# Patient Record
Sex: MALE | Race: WHITE | NOT HISPANIC OR LATINO | Employment: FULL TIME | URBAN - METROPOLITAN AREA
[De-identification: names, ages, dates, MRNs, and addresses within clinical notes are randomized per-mention and may not be internally consistent; named-entity substitution may affect disease eponyms.]

---

## 2020-09-01 ENCOUNTER — OFFICE VISIT CONVERTED (OUTPATIENT)
Dept: SURGERY | Facility: CLINIC | Age: 65
End: 2020-09-01
Attending: SURGERY

## 2020-09-03 ENCOUNTER — CONVERSION ENCOUNTER (OUTPATIENT)
Dept: SURGERY | Facility: CLINIC | Age: 65
End: 2020-09-03

## 2020-09-03 ENCOUNTER — OFFICE VISIT CONVERTED (OUTPATIENT)
Dept: SURGERY | Facility: CLINIC | Age: 65
End: 2020-09-03
Attending: SURGERY

## 2020-09-15 ENCOUNTER — OFFICE VISIT CONVERTED (OUTPATIENT)
Dept: SURGERY | Facility: CLINIC | Age: 65
End: 2020-09-15
Attending: SURGERY

## 2020-09-17 ENCOUNTER — HOSPITAL ENCOUNTER (OUTPATIENT)
Dept: NUCLEAR MEDICINE | Facility: HOSPITAL | Age: 65
Discharge: HOME | End: 2020-09-17
Attending: FAMILY MEDICINE

## 2020-09-22 ENCOUNTER — OFFICE VISIT CONVERTED (OUTPATIENT)
Dept: ONCOLOGY | Facility: HOSPITAL | Age: 65
End: 2020-09-22
Attending: INTERNAL MEDICINE

## 2020-09-22 ENCOUNTER — HOSPITAL ENCOUNTER (OUTPATIENT)
Dept: ONCOLOGY | Facility: HOSPITAL | Age: 65
Discharge: HOME | End: 2020-09-22
Attending: INTERNAL MEDICINE

## 2020-10-26 ENCOUNTER — OFFICE VISIT CONVERTED (OUTPATIENT)
Dept: ONCOLOGY | Facility: HOSPITAL | Age: 65
End: 2020-10-26
Attending: INTERNAL MEDICINE

## 2020-11-24 ENCOUNTER — CONVERSION ENCOUNTER (OUTPATIENT)
Dept: SURGERY | Facility: CLINIC | Age: 65
End: 2020-11-24

## 2020-11-24 ENCOUNTER — OFFICE VISIT CONVERTED (OUTPATIENT)
Dept: SURGERY | Facility: CLINIC | Age: 65
End: 2020-11-24
Attending: SURGERY

## 2020-11-25 ENCOUNTER — HOSPITAL ENCOUNTER (OUTPATIENT)
Dept: GASTROENTEROLOGY | Facility: HOSPITAL | Age: 65
Setting detail: HOSPITAL OUTPATIENT SURGERY
Discharge: HOME | End: 2020-11-25
Attending: SURGERY

## 2020-12-07 ENCOUNTER — HOSPITAL ENCOUNTER (OUTPATIENT)
Dept: OTHER | Facility: HOSPITAL | Age: 65
Discharge: HOME | End: 2020-12-07
Attending: INTERNAL MEDICINE

## 2020-12-07 ENCOUNTER — OFFICE VISIT CONVERTED (OUTPATIENT)
Dept: ONCOLOGY | Facility: HOSPITAL | Age: 65
End: 2020-12-07
Attending: INTERNAL MEDICINE

## 2020-12-07 LAB
ALBUMIN SERPL-MCNC: 3.7 G/DL (ref 3.5–5)
ALBUMIN/GLOB SERPL: 0.9 {RATIO} (ref 1.4–2.6)
ALP SERPL-CCNC: 162 U/L (ref 56–155)
ALT SERPL-CCNC: 27 U/L (ref 10–40)
ANION GAP SERPL CALC-SCNC: 17 MMOL/L (ref 8–19)
AST SERPL-CCNC: 34 U/L (ref 15–50)
BASOPHILS # BLD AUTO: 0.11 10*3/UL (ref 0–0.2)
BASOPHILS NFR BLD AUTO: 0.8 % (ref 0–3)
BILIRUB SERPL-MCNC: 0.43 MG/DL (ref 0.2–1.3)
BUN SERPL-MCNC: 11 MG/DL (ref 5–25)
BUN/CREAT SERPL: 13 {RATIO} (ref 6–20)
CALCIUM SERPL-MCNC: 9.5 MG/DL (ref 8.7–10.4)
CHLORIDE SERPL-SCNC: 95 MMOL/L (ref 99–111)
CONV ABS IMM GRAN: 0.13 10*3/UL (ref 0–0.2)
CONV CO2: 25 MMOL/L (ref 22–32)
CONV IMMATURE GRAN: 0.9 % (ref 0–1.8)
CONV TOTAL PROTEIN: 7.9 G/DL (ref 6.3–8.2)
CREAT UR-MCNC: 0.82 MG/DL (ref 0.7–1.2)
DEPRECATED RDW RBC AUTO: 38.5 FL (ref 35.1–43.9)
EOSINOPHIL # BLD AUTO: 0.41 10*3/UL (ref 0–0.7)
EOSINOPHIL # BLD AUTO: 2.8 % (ref 0–7)
ERYTHROCYTE [DISTWIDTH] IN BLOOD BY AUTOMATED COUNT: 13.5 % (ref 11.6–14.4)
GFR SERPLBLD BASED ON 1.73 SQ M-ARVRAT: >60 ML/MIN/{1.73_M2}
GLOBULIN UR ELPH-MCNC: 4.2 G/DL (ref 2–3.5)
GLUCOSE SERPL-MCNC: 103 MG/DL (ref 70–99)
HCT VFR BLD AUTO: 36.1 % (ref 42–52)
HGB BLD-MCNC: 11.5 G/DL (ref 14–18)
LYMPHOCYTES # BLD AUTO: 2.29 10*3/UL (ref 1–5)
LYMPHOCYTES NFR BLD AUTO: 15.8 % (ref 20–45)
MCH RBC QN AUTO: 25.2 PG (ref 27–31)
MCHC RBC AUTO-ENTMCNC: 31.9 G/DL (ref 33–37)
MCV RBC AUTO: 79.2 FL (ref 80–96)
MONOCYTES # BLD AUTO: 1.09 10*3/UL (ref 0.2–1.2)
MONOCYTES NFR BLD AUTO: 7.5 % (ref 3–10)
NEUTROPHILS # BLD AUTO: 10.49 10*3/UL (ref 2–8)
NEUTROPHILS NFR BLD AUTO: 72.2 % (ref 30–85)
NRBC CBCN: 0 % (ref 0–0.7)
OSMOLALITY SERPL CALC.SUM OF ELEC: 276 MOSM/KG (ref 273–304)
PLATELET # BLD AUTO: 507 10*3/UL (ref 130–400)
PMV BLD AUTO: 8.5 FL (ref 9.4–12.4)
POTASSIUM SERPL-SCNC: 4.3 MMOL/L (ref 3.5–5.3)
RBC # BLD AUTO: 4.56 10*6/UL (ref 4.7–6.1)
SODIUM SERPL-SCNC: 133 MMOL/L (ref 135–147)
WBC # BLD AUTO: 14.52 10*3/UL (ref 4.8–10.8)

## 2020-12-10 ENCOUNTER — HOSPITAL ENCOUNTER (OUTPATIENT)
Dept: OTHER | Facility: HOSPITAL | Age: 65
Setting detail: RECURRING SERIES
Discharge: HOME | End: 2021-03-10
Attending: INTERNAL MEDICINE

## 2020-12-17 LAB
ALBUMIN SERPL-MCNC: 3.2 G/DL (ref 3.5–5)
ALBUMIN/GLOB SERPL: 0.9 {RATIO} (ref 1.4–2.6)
ALP SERPL-CCNC: 134 U/L (ref 56–155)
ALT SERPL-CCNC: 49 U/L (ref 10–40)
ANION GAP SERPL CALC-SCNC: 14 MMOL/L (ref 8–19)
AST SERPL-CCNC: 31 U/L (ref 15–50)
BASOPHILS # BLD AUTO: 0.03 10*3/UL (ref 0–0.2)
BASOPHILS NFR BLD AUTO: 0.2 % (ref 0–3)
BILIRUB SERPL-MCNC: 0.23 MG/DL (ref 0.2–1.3)
BUN SERPL-MCNC: 11 MG/DL (ref 5–25)
BUN/CREAT SERPL: 16 {RATIO} (ref 6–20)
CALCIUM SERPL-MCNC: 9.2 MG/DL (ref 8.7–10.4)
CHLORIDE SERPL-SCNC: 100 MMOL/L (ref 99–111)
CONV ABS IMM GRAN: 0.38 10*3/UL (ref 0–0.54)
CONV CO2: 22 MMOL/L (ref 22–32)
CONV EOSINOPHILS PERCENT BY MANUAL COUNT: 3.9 % (ref 0–7)
CONV IMMATURE GRAN: 3.1 % (ref 0–0.4)
CONV TOTAL PROTEIN: 6.6 G/DL (ref 6.3–8.2)
CREAT UR-MCNC: 0.69 MG/DL (ref 0.7–1.2)
EOSINOPHIL # BLD MANUAL: 0.47 10*3/UL (ref 0–0.7)
ERYTHROCYTE [DISTWIDTH] IN BLOOD BY AUTOMATED COUNT: 13.4 % (ref 11.5–14.5)
ERYTHROCYTE [DISTWIDTH] IN BLOOD BY AUTOMATED COUNT: 39.8 FL
GFR SERPLBLD BASED ON 1.73 SQ M-ARVRAT: >60 ML/MIN/{1.73_M2}
GLOBULIN UR ELPH-MCNC: 3.4 G/DL (ref 2–3.5)
GLUCOSE SERPL-MCNC: 255 MG/DL (ref 70–99)
HBA1C MFR BLD: 10.8 G/DL (ref 14–18)
HCT VFR BLD AUTO: 34.1 % (ref 42–52)
LYMPHOCYTES # BLD AUTO: 1.77 10*3/UL (ref 1–5)
LYMPHOCYTES NFR BLD AUTO: 14.6 % (ref 20–45)
MAGNESIUM SERPL-MCNC: 1.81 MG/DL (ref 1.6–2.3)
MCH RBC QN AUTO: 25.7 PG (ref 27–31)
MCHC RBC AUTO-ENTMCNC: 31.7 G/DL (ref 33–37)
MCV RBC AUTO: 81.2 FL (ref 80–96)
MONOCYTES # BLD AUTO: 0.65 10*3/UL (ref 0.2–1.2)
MONOCYTES NFR BLD MANUAL: 5.4 % (ref 3–10)
NEUTROPHILS # BLD AUTO: 8.83 10*3/UL (ref 2–8)
NEUTROPHILS NFR BLD MANUAL: 72.8 % (ref 30–85)
OSMOLALITY SERPL CALC.SUM OF ELEC: 282 MOSM/KG (ref 273–304)
PLATELET # BLD AUTO: 146 10*3/UL (ref 130–400)
PMV BLD AUTO: 8.8 FL (ref 7.4–10.4)
POTASSIUM SERPL-SCNC: 3.9 MMOL/L (ref 3.5–5.3)
RBC MORPH BLD: 4.2 10*6/UL (ref 4.7–6.1)
SODIUM SERPL-SCNC: 132 MMOL/L (ref 135–147)
WBC # BLD AUTO: 12.13 10*3/UL (ref 4.8–10.8)

## 2020-12-31 ENCOUNTER — OFFICE VISIT CONVERTED (OUTPATIENT)
Dept: ONCOLOGY | Facility: HOSPITAL | Age: 65
End: 2020-12-31
Attending: INTERNAL MEDICINE

## 2020-12-31 LAB
ALBUMIN SERPL-MCNC: 3.4 G/DL (ref 3.5–5)
ALBUMIN/GLOB SERPL: 1 {RATIO} (ref 1.4–2.6)
ALP SERPL-CCNC: 102 U/L (ref 56–155)
ALT SERPL-CCNC: 16 U/L (ref 10–40)
ANION GAP SERPL CALC-SCNC: 14 MMOL/L (ref 8–19)
AST SERPL-CCNC: 12 U/L (ref 15–50)
BASOPHILS # BLD AUTO: 0.06 10*3/UL (ref 0–0.2)
BASOPHILS NFR BLD AUTO: 0.4 % (ref 0–3)
BILIRUB SERPL-MCNC: 0.32 MG/DL (ref 0.2–1.3)
BUN SERPL-MCNC: 13 MG/DL (ref 5–25)
BUN/CREAT SERPL: 19 {RATIO} (ref 6–20)
CALCIUM SERPL-MCNC: 9.5 MG/DL (ref 8.7–10.4)
CHLORIDE SERPL-SCNC: 98 MMOL/L (ref 99–111)
CONV ABS IMM GRAN: 0.5 10*3/UL (ref 0–0.54)
CONV CO2: 25 MMOL/L (ref 22–32)
CONV EOSINOPHILS PERCENT BY MANUAL COUNT: 12.3 % (ref 0–7)
CONV IMMATURE GRAN: 3.5 % (ref 0–0.4)
CONV TOTAL PROTEIN: 6.7 G/DL (ref 6.3–8.2)
CREAT UR-MCNC: 0.69 MG/DL (ref 0.7–1.2)
EOSINOPHIL # BLD MANUAL: 1.78 10*3/UL (ref 0–0.7)
ERYTHROCYTE [DISTWIDTH] IN BLOOD BY AUTOMATED COUNT: 15.3 % (ref 11.5–14.5)
ERYTHROCYTE [DISTWIDTH] IN BLOOD BY AUTOMATED COUNT: 43.3 FL
FERRITIN SERPL-MCNC: 899 NG/ML (ref 30–300)
GFR SERPLBLD BASED ON 1.73 SQ M-ARVRAT: >60 ML/MIN/{1.73_M2}
GLOBULIN UR ELPH-MCNC: 3.3 G/DL (ref 2–3.5)
GLUCOSE SERPL-MCNC: 230 MG/DL (ref 70–99)
HBA1C MFR BLD: 10.2 G/DL (ref 14–18)
HCT VFR BLD AUTO: 32.2 % (ref 42–52)
IRON SATN MFR SERPL: 12 % (ref 20–55)
IRON SERPL-MCNC: 27 UG/DL (ref 70–180)
LYMPHOCYTES # BLD AUTO: 1.72 10*3/UL (ref 1–5)
LYMPHOCYTES NFR BLD AUTO: 11.9 % (ref 20–45)
MAGNESIUM SERPL-MCNC: 1.83 MG/DL (ref 1.6–2.3)
MCH RBC QN AUTO: 25.9 PG (ref 27–31)
MCHC RBC AUTO-ENTMCNC: 31.7 G/DL (ref 33–37)
MCV RBC AUTO: 81.7 FL (ref 80–96)
MONOCYTES # BLD AUTO: 1.24 10*3/UL (ref 0.2–1.2)
MONOCYTES NFR BLD MANUAL: 8.6 % (ref 3–10)
NEUTROPHILS # BLD AUTO: 9.12 10*3/UL (ref 2–8)
NEUTROPHILS NFR BLD MANUAL: 63.3 % (ref 30–85)
OSMOLALITY SERPL CALC.SUM OF ELEC: 283 MOSM/KG (ref 273–304)
PLATELET # BLD AUTO: 689 10*3/UL (ref 130–400)
PMV BLD AUTO: 8.4 FL (ref 7.4–10.4)
POTASSIUM SERPL-SCNC: 4.3 MMOL/L (ref 3.5–5.3)
RBC MORPH BLD: 3.94 10*6/UL (ref 4.7–6.1)
SODIUM SERPL-SCNC: 133 MMOL/L (ref 135–147)
TIBC SERPL-MCNC: 225 UG/DL (ref 245–450)
TRANSFERRIN SERPL-MCNC: 157 MG/DL (ref 215–365)
WBC # BLD AUTO: 14.42 10*3/UL (ref 4.8–10.8)

## 2021-01-07 LAB
ALBUMIN SERPL-MCNC: 3.5 G/DL (ref 3.5–5)
ALBUMIN/GLOB SERPL: 1.1 {RATIO} (ref 1.4–2.6)
ALP SERPL-CCNC: 114 U/L (ref 56–155)
ALT SERPL-CCNC: 25 U/L (ref 10–40)
ANION GAP SERPL CALC-SCNC: 15 MMOL/L (ref 8–19)
AST SERPL-CCNC: 13 U/L (ref 15–50)
BASOPHILS # BLD AUTO: 0.05 10*3/UL (ref 0–0.2)
BASOPHILS NFR BLD AUTO: 0.3 % (ref 0–3)
BILIRUB SERPL-MCNC: 0.29 MG/DL (ref 0.2–1.3)
BUN SERPL-MCNC: 18 MG/DL (ref 5–25)
BUN/CREAT SERPL: 22 {RATIO} (ref 6–20)
CALCIUM SERPL-MCNC: 9.6 MG/DL (ref 8.7–10.4)
CHLORIDE SERPL-SCNC: 95 MMOL/L (ref 99–111)
CONV ABS IMM GRAN: 1.13 10*3/UL (ref 0–0.54)
CONV CO2: 24 MMOL/L (ref 22–32)
CONV EOSINOPHILS PERCENT BY MANUAL COUNT: 5.5 % (ref 0–7)
CONV IMMATURE GRAN: 6.7 % (ref 0–0.4)
CONV TOTAL PROTEIN: 6.8 G/DL (ref 6.3–8.2)
CREAT UR-MCNC: 0.81 MG/DL (ref 0.7–1.2)
EOSINOPHIL # BLD MANUAL: 0.93 10*3/UL (ref 0–0.7)
ERYTHROCYTE [DISTWIDTH] IN BLOOD BY AUTOMATED COUNT: 14.9 % (ref 11.5–14.5)
ERYTHROCYTE [DISTWIDTH] IN BLOOD BY AUTOMATED COUNT: 42.2 FL
GFR SERPLBLD BASED ON 1.73 SQ M-ARVRAT: >60 ML/MIN/{1.73_M2}
GLOBULIN UR ELPH-MCNC: 3.3 G/DL (ref 2–3.5)
GLUCOSE SERPL-MCNC: 311 MG/DL (ref 70–99)
HBA1C MFR BLD: 10.4 G/DL (ref 14–18)
HCT VFR BLD AUTO: 32.8 % (ref 42–52)
LYMPHOCYTES # BLD AUTO: 1.85 10*3/UL (ref 1–5)
LYMPHOCYTES NFR BLD AUTO: 10.9 % (ref 20–45)
MAGNESIUM SERPL-MCNC: 1.85 MG/DL (ref 1.6–2.3)
MCH RBC QN AUTO: 25.6 PG (ref 27–31)
MCHC RBC AUTO-ENTMCNC: 31.7 G/DL (ref 33–37)
MCV RBC AUTO: 80.8 FL (ref 80–96)
MONOCYTES # BLD AUTO: 1.08 10*3/UL (ref 0.2–1.2)
MONOCYTES NFR BLD MANUAL: 6.4 % (ref 3–10)
NEUTROPHILS # BLD AUTO: 11.92 10*3/UL (ref 2–8)
NEUTROPHILS NFR BLD MANUAL: 70.2 % (ref 30–85)
OSMOLALITY SERPL CALC.SUM OF ELEC: 284 MOSM/KG (ref 273–304)
PLATELET # BLD AUTO: 154 10*3/UL (ref 130–400)
PMV BLD AUTO: 8.8 FL (ref 7.4–10.4)
POTASSIUM SERPL-SCNC: 4.1 MMOL/L (ref 3.5–5.3)
RBC MORPH BLD: 4.06 10*6/UL (ref 4.7–6.1)
SODIUM SERPL-SCNC: 130 MMOL/L (ref 135–147)
WBC # BLD AUTO: 16.96 10*3/UL (ref 4.8–10.8)

## 2021-01-21 ENCOUNTER — OFFICE VISIT CONVERTED (OUTPATIENT)
Dept: ONCOLOGY | Facility: HOSPITAL | Age: 66
End: 2021-01-21
Attending: INTERNAL MEDICINE

## 2021-01-21 LAB
ALBUMIN SERPL-MCNC: 3.3 G/DL (ref 3.5–5)
ALBUMIN/GLOB SERPL: 1.1 {RATIO} (ref 1.4–2.6)
ALP SERPL-CCNC: 91 U/L (ref 56–155)
ALT SERPL-CCNC: 10 U/L (ref 10–40)
ANION GAP SERPL CALC-SCNC: 16 MMOL/L (ref 8–19)
AST SERPL-CCNC: 9 U/L (ref 15–50)
BASOPHILS # BLD AUTO: 0.07 10*3/UL (ref 0–0.2)
BASOPHILS NFR BLD AUTO: 0.5 % (ref 0–3)
BILIRUB SERPL-MCNC: 0.44 MG/DL (ref 0.2–1.3)
BUN SERPL-MCNC: 14 MG/DL (ref 5–25)
BUN/CREAT SERPL: 20 {RATIO} (ref 6–20)
CALCIUM SERPL-MCNC: 9.4 MG/DL (ref 8.7–10.4)
CHLORIDE SERPL-SCNC: 99 MMOL/L (ref 99–111)
CONV ABS IMM GRAN: 0.32 10*3/UL (ref 0–0.54)
CONV CO2: 23 MMOL/L (ref 22–32)
CONV EOSINOPHILS PERCENT BY MANUAL COUNT: 17.8 % (ref 0–7)
CONV IMMATURE GRAN: 2.1 % (ref 0–0.4)
CONV TOTAL PROTEIN: 6.4 G/DL (ref 6.3–8.2)
CREAT UR-MCNC: 0.7 MG/DL (ref 0.7–1.2)
EOSINOPHIL # BLD MANUAL: 2.71 10*3/UL (ref 0–0.7)
ERYTHROCYTE [DISTWIDTH] IN BLOOD BY AUTOMATED COUNT: 17.5 % (ref 11.5–14.5)
ERYTHROCYTE [DISTWIDTH] IN BLOOD BY AUTOMATED COUNT: 51.2 FL
GFR SERPLBLD BASED ON 1.73 SQ M-ARVRAT: >60 ML/MIN/{1.73_M2}
GLOBULIN UR ELPH-MCNC: 3.1 G/DL (ref 2–3.5)
GLUCOSE SERPL-MCNC: 197 MG/DL (ref 70–99)
HBA1C MFR BLD: 8.9 G/DL (ref 14–18)
HCT VFR BLD AUTO: 27.9 % (ref 42–52)
LYMPHOCYTES # BLD AUTO: 1.51 10*3/UL (ref 1–5)
LYMPHOCYTES NFR BLD AUTO: 9.9 % (ref 20–45)
MAGNESIUM SERPL-MCNC: 1.78 MG/DL (ref 1.6–2.3)
MCH RBC QN AUTO: 26.4 PG (ref 27–31)
MCHC RBC AUTO-ENTMCNC: 31.9 G/DL (ref 33–37)
MCV RBC AUTO: 82.8 FL (ref 80–96)
MONOCYTES # BLD AUTO: 1.29 10*3/UL (ref 0.2–1.2)
MONOCYTES NFR BLD MANUAL: 8.5 % (ref 3–10)
NEUTROPHILS # BLD AUTO: 9.35 10*3/UL (ref 2–8)
NEUTROPHILS NFR BLD MANUAL: 61.2 % (ref 30–85)
OSMOLALITY SERPL CALC.SUM OF ELEC: 284 MOSM/KG (ref 273–304)
PLATELET # BLD AUTO: 484 10*3/UL (ref 130–400)
PMV BLD AUTO: 8.7 FL (ref 7.4–10.4)
POTASSIUM SERPL-SCNC: 4 MMOL/L (ref 3.5–5.3)
RBC MORPH BLD: 3.37 10*6/UL (ref 4.7–6.1)
SODIUM SERPL-SCNC: 134 MMOL/L (ref 135–147)
WBC # BLD AUTO: 15.25 10*3/UL (ref 4.8–10.8)

## 2021-01-28 LAB
ALBUMIN SERPL-MCNC: 3.5 G/DL (ref 3.5–5)
ALBUMIN/GLOB SERPL: 1 {RATIO} (ref 1.4–2.6)
ALP SERPL-CCNC: 134 U/L (ref 56–155)
ALT SERPL-CCNC: 28 U/L (ref 10–40)
ANION GAP SERPL CALC-SCNC: 18 MMOL/L (ref 8–19)
AST SERPL-CCNC: 13 U/L (ref 15–50)
BASOPHILS # BLD AUTO: 0.04 10*3/UL (ref 0–0.2)
BASOPHILS NFR BLD AUTO: 0.2 % (ref 0–3)
BILIRUB SERPL-MCNC: 0.39 MG/DL (ref 0.2–1.3)
BUN SERPL-MCNC: 19 MG/DL (ref 5–25)
BUN/CREAT SERPL: 25 {RATIO} (ref 6–20)
CALCIUM SERPL-MCNC: 9.4 MG/DL (ref 8.7–10.4)
CHLORIDE SERPL-SCNC: 95 MMOL/L (ref 99–111)
CONV ABS IMM GRAN: 0.72 10*3/UL (ref 0–0.54)
CONV CO2: 22 MMOL/L (ref 22–32)
CONV EOSINOPHILS PERCENT BY MANUAL COUNT: 6.9 % (ref 0–7)
CONV IMMATURE GRAN: 4.4 % (ref 0–0.4)
CONV TOTAL PROTEIN: 7 G/DL (ref 6.3–8.2)
CREAT UR-MCNC: 0.76 MG/DL (ref 0.7–1.2)
EOSINOPHIL # BLD MANUAL: 1.13 10*3/UL (ref 0–0.7)
ERYTHROCYTE [DISTWIDTH] IN BLOOD BY AUTOMATED COUNT: 17.4 % (ref 11.5–14.5)
ERYTHROCYTE [DISTWIDTH] IN BLOOD BY AUTOMATED COUNT: 51.9 FL
GFR SERPLBLD BASED ON 1.73 SQ M-ARVRAT: >60 ML/MIN/{1.73_M2}
GLOBULIN UR ELPH-MCNC: 3.5 G/DL (ref 2–3.5)
GLUCOSE SERPL-MCNC: 242 MG/DL (ref 70–99)
HBA1C MFR BLD: 9.3 G/DL (ref 14–18)
HCT VFR BLD AUTO: 29.5 % (ref 42–52)
LYMPHOCYTES # BLD AUTO: 2.01 10*3/UL (ref 1–5)
LYMPHOCYTES NFR BLD AUTO: 12.3 % (ref 20–45)
MAGNESIUM SERPL-MCNC: 1.93 MG/DL (ref 1.6–2.3)
MCH RBC QN AUTO: 26.3 PG (ref 27–31)
MCHC RBC AUTO-ENTMCNC: 31.5 G/DL (ref 33–37)
MCV RBC AUTO: 83.3 FL (ref 80–96)
MONOCYTES # BLD AUTO: 1.39 10*3/UL (ref 0.2–1.2)
MONOCYTES NFR BLD MANUAL: 8.5 % (ref 3–10)
NEUTROPHILS # BLD AUTO: 11.11 10*3/UL (ref 2–8)
NEUTROPHILS NFR BLD MANUAL: 67.7 % (ref 30–85)
OSMOLALITY SERPL CALC.SUM OF ELEC: 282 MOSM/KG (ref 273–304)
PLATELET # BLD AUTO: 131 10*3/UL (ref 130–400)
PMV BLD AUTO: 8.4 FL (ref 7.4–10.4)
POTASSIUM SERPL-SCNC: 4.4 MMOL/L (ref 3.5–5.3)
RBC MORPH BLD: 3.54 10*6/UL (ref 4.7–6.1)
SODIUM SERPL-SCNC: 131 MMOL/L (ref 135–147)
WBC # BLD AUTO: 16.4 10*3/UL (ref 4.8–10.8)

## 2021-02-04 ENCOUNTER — HOSPITAL ENCOUNTER (OUTPATIENT)
Dept: CT IMAGING | Facility: HOSPITAL | Age: 66
Discharge: HOME | End: 2021-02-04
Attending: INTERNAL MEDICINE

## 2021-02-12 LAB
ALBUMIN SERPL-MCNC: 3.2 G/DL (ref 3.5–5)
ALBUMIN/GLOB SERPL: 1 {RATIO} (ref 1.4–2.6)
ALP SERPL-CCNC: 107 U/L (ref 56–155)
ALT SERPL-CCNC: 10 U/L (ref 10–40)
ANION GAP SERPL CALC-SCNC: 18 MMOL/L (ref 8–19)
AST SERPL-CCNC: 10 U/L (ref 15–50)
BASOPHILS # BLD AUTO: 0.04 10*3/UL (ref 0–0.2)
BASOPHILS NFR BLD AUTO: 0.2 % (ref 0–3)
BILIRUB SERPL-MCNC: 0.47 MG/DL (ref 0.2–1.3)
BUN SERPL-MCNC: 13 MG/DL (ref 5–25)
BUN/CREAT SERPL: 21 {RATIO} (ref 6–20)
CALCIUM SERPL-MCNC: 9.2 MG/DL (ref 8.7–10.4)
CHLORIDE SERPL-SCNC: 94 MMOL/L (ref 99–111)
CONV ABS IMM GRAN: 0.5 10*3/UL (ref 0–0.54)
CONV CO2: 20 MMOL/L (ref 22–32)
CONV EOSINOPHILS PERCENT BY MANUAL COUNT: 9.3 % (ref 0–7)
CONV IMMATURE GRAN: 2.9 % (ref 0–0.4)
CONV TOTAL PROTEIN: 6.4 G/DL (ref 6.3–8.2)
CREAT UR-MCNC: 0.63 MG/DL (ref 0.7–1.2)
EOSINOPHIL # BLD MANUAL: 1.61 10*3/UL (ref 0–0.7)
ERYTHROCYTE [DISTWIDTH] IN BLOOD BY AUTOMATED COUNT: 18.9 % (ref 11.5–14.5)
ERYTHROCYTE [DISTWIDTH] IN BLOOD BY AUTOMATED COUNT: 57.6 FL
GFR SERPLBLD BASED ON 1.73 SQ M-ARVRAT: >60 ML/MIN/{1.73_M2}
GLOBULIN UR ELPH-MCNC: 3.2 G/DL (ref 2–3.5)
GLUCOSE SERPL-MCNC: 177 MG/DL (ref 70–99)
HBA1C MFR BLD: 8.4 G/DL (ref 14–18)
HCT VFR BLD AUTO: 27 % (ref 42–52)
LYMPHOCYTES # BLD AUTO: 2.02 10*3/UL (ref 1–5)
LYMPHOCYTES NFR BLD AUTO: 11.7 % (ref 20–45)
MAGNESIUM SERPL-MCNC: 1.76 MG/DL (ref 1.6–2.3)
MCH RBC QN AUTO: 26.4 PG (ref 27–31)
MCHC RBC AUTO-ENTMCNC: 31.1 G/DL (ref 33–37)
MCV RBC AUTO: 84.9 FL (ref 80–96)
MONOCYTES # BLD AUTO: 1.98 10*3/UL (ref 0.2–1.2)
MONOCYTES NFR BLD MANUAL: 11.4 % (ref 3–10)
NEUTROPHILS # BLD AUTO: 11.15 10*3/UL (ref 2–8)
NEUTROPHILS NFR BLD MANUAL: 64.5 % (ref 30–85)
OSMOLALITY SERPL CALC.SUM OF ELEC: 270 MOSM/KG (ref 273–304)
PLATELET # BLD AUTO: 610 10*3/UL (ref 130–400)
PMV BLD AUTO: 8.6 FL (ref 7.4–10.4)
POTASSIUM SERPL-SCNC: 3.8 MMOL/L (ref 3.5–5.3)
RBC MORPH BLD: 3.18 10*6/UL (ref 4.7–6.1)
SODIUM SERPL-SCNC: 128 MMOL/L (ref 135–147)
WBC # BLD AUTO: 17.3 10*3/UL (ref 4.8–10.8)

## 2021-02-16 ENCOUNTER — OFFICE VISIT CONVERTED (OUTPATIENT)
Dept: ONCOLOGY | Facility: HOSPITAL | Age: 66
End: 2021-02-16
Attending: INTERNAL MEDICINE

## 2021-03-02 ENCOUNTER — OFFICE VISIT CONVERTED (OUTPATIENT)
Dept: ONCOLOGY | Facility: HOSPITAL | Age: 66
End: 2021-03-02
Attending: INTERNAL MEDICINE

## 2021-03-02 LAB
ALBUMIN SERPL-MCNC: 3 G/DL (ref 3.5–5)
ALBUMIN/GLOB SERPL: 0.9 {RATIO} (ref 1.4–2.6)
ALP SERPL-CCNC: 124 U/L (ref 56–155)
ALT SERPL-CCNC: 11 U/L (ref 10–40)
ANION GAP SERPL CALC-SCNC: 16 MMOL/L (ref 8–19)
AST SERPL-CCNC: 15 U/L (ref 15–50)
BASOPHILS # BLD AUTO: 0.01 10*3/UL (ref 0–0.2)
BASOPHILS NFR BLD AUTO: 0 % (ref 0–3)
BILIRUB SERPL-MCNC: 0.34 MG/DL (ref 0.2–1.3)
BUN SERPL-MCNC: 15 MG/DL (ref 5–25)
BUN/CREAT SERPL: 24 {RATIO} (ref 6–20)
CALCIUM SERPL-MCNC: 9.3 MG/DL (ref 8.7–10.4)
CHLORIDE SERPL-SCNC: 91 MMOL/L (ref 99–111)
CONV ABS IMM GRAN: 0.42 10*3/UL (ref 0–0.54)
CONV CO2: 22 MMOL/L (ref 22–32)
CONV EOSINOPHILS PERCENT BY MANUAL COUNT: 1.9 % (ref 0–7)
CONV IMMATURE GRAN: 2 % (ref 0–0.4)
CONV TOTAL PROTEIN: 6.2 G/DL (ref 6.3–8.2)
CREAT UR-MCNC: 0.63 MG/DL (ref 0.7–1.2)
EOSINOPHIL # BLD MANUAL: 0.39 10*3/UL (ref 0–0.7)
ERYTHROCYTE [DISTWIDTH] IN BLOOD BY AUTOMATED COUNT: 17.1 % (ref 11.5–14.5)
ERYTHROCYTE [DISTWIDTH] IN BLOOD BY AUTOMATED COUNT: 52.6 FL
GFR SERPLBLD BASED ON 1.73 SQ M-ARVRAT: >60 ML/MIN/{1.73_M2}
GLOBULIN UR ELPH-MCNC: 3.2 G/DL (ref 2–3.5)
GLUCOSE SERPL-MCNC: 213 MG/DL (ref 70–99)
HBA1C MFR BLD: 9.1 G/DL (ref 14–18)
HCT VFR BLD AUTO: 29.3 % (ref 42–52)
LYMPHOCYTES # BLD AUTO: 0.85 10*3/UL (ref 1–5)
LYMPHOCYTES NFR BLD AUTO: 4.1 % (ref 20–45)
MCH RBC QN AUTO: 26 PG (ref 27–31)
MCHC RBC AUTO-ENTMCNC: 31.1 G/DL (ref 33–37)
MCV RBC AUTO: 83.7 FL (ref 80–96)
MONOCYTES # BLD AUTO: 1.37 10*3/UL (ref 0.2–1.2)
MONOCYTES NFR BLD MANUAL: 6.6 % (ref 3–10)
NEUTROPHILS # BLD AUTO: 17.64 10*3/UL (ref 2–8)
NEUTROPHILS NFR BLD MANUAL: 85.4 % (ref 30–85)
OSMOLALITY SERPL CALC.SUM OF ELEC: 267 MOSM/KG (ref 273–304)
PLATELET # BLD AUTO: 470 10*3/UL (ref 130–400)
PMV BLD AUTO: 8.6 FL (ref 7.4–10.4)
POTASSIUM SERPL-SCNC: 4.2 MMOL/L (ref 3.5–5.3)
RBC MORPH BLD: 3.5 10*6/UL (ref 4.7–6.1)
SODIUM SERPL-SCNC: 125 MMOL/L (ref 135–147)
T4 FREE SERPL-MCNC: 1.3 NG/DL (ref 0.9–1.8)
TSH SERPL-ACNC: 1.05 M[IU]/L (ref 0.27–4.2)
WBC # BLD AUTO: 20.68 10*3/UL (ref 4.8–10.8)

## 2021-03-23 ENCOUNTER — OFFICE VISIT CONVERTED (OUTPATIENT)
Dept: ONCOLOGY | Facility: HOSPITAL | Age: 66
End: 2021-03-23
Attending: INTERNAL MEDICINE

## 2021-03-23 LAB
ALBUMIN SERPL-MCNC: 2.6 G/DL (ref 3.5–5)
ALBUMIN/GLOB SERPL: 0.8 {RATIO} (ref 1.4–2.6)
ALP SERPL-CCNC: 133 U/L (ref 56–155)
ALT SERPL-CCNC: 9 U/L (ref 10–40)
ANION GAP SERPL CALC-SCNC: 13 MMOL/L (ref 8–19)
AST SERPL-CCNC: 28 U/L (ref 15–50)
BASOPHILS # BLD AUTO: 0.03 10*3/UL (ref 0–0.2)
BASOPHILS NFR BLD AUTO: 0.3 % (ref 0–3)
BILIRUB SERPL-MCNC: 0.26 MG/DL (ref 0.2–1.3)
BUN SERPL-MCNC: 16 MG/DL (ref 5–25)
BUN/CREAT SERPL: 30 {RATIO} (ref 6–20)
CALCIUM SERPL-MCNC: 9.2 MG/DL (ref 8.7–10.4)
CHLORIDE SERPL-SCNC: 97 MMOL/L (ref 99–111)
CONV ABS IMM GRAN: 0.02 10*3/UL (ref 0–0.54)
CONV CO2: 25 MMOL/L (ref 22–32)
CONV EOSINOPHILS PERCENT BY MANUAL COUNT: 2.6 % (ref 0–7)
CONV IMMATURE GRAN: 0.2 % (ref 0–0.4)
CONV TOTAL PROTEIN: 5.8 G/DL (ref 6.3–8.2)
CREAT UR-MCNC: 0.53 MG/DL (ref 0.7–1.2)
EOSINOPHIL # BLD MANUAL: 0.22 10*3/UL (ref 0–0.7)
ERYTHROCYTE [DISTWIDTH] IN BLOOD BY AUTOMATED COUNT: 16.8 % (ref 11.5–14.5)
ERYTHROCYTE [DISTWIDTH] IN BLOOD BY AUTOMATED COUNT: 47.9 FL
GFR SERPLBLD BASED ON 1.73 SQ M-ARVRAT: >60 ML/MIN/{1.73_M2}
GLOBULIN UR ELPH-MCNC: 3.2 G/DL (ref 2–3.5)
GLUCOSE SERPL-MCNC: 147 MG/DL (ref 70–99)
HBA1C MFR BLD: 7.9 G/DL (ref 14–18)
HCT VFR BLD AUTO: 26.5 % (ref 42–52)
LYMPHOCYTES # BLD AUTO: 1.14 10*3/UL (ref 1–5)
LYMPHOCYTES NFR BLD AUTO: 13.3 % (ref 20–45)
MCH RBC QN AUTO: 23.8 PG (ref 27–31)
MCHC RBC AUTO-ENTMCNC: 29.8 G/DL (ref 33–37)
MCV RBC AUTO: 79.8 FL (ref 80–96)
MONOCYTES # BLD AUTO: 0.7 10*3/UL (ref 0.2–1.2)
MONOCYTES NFR BLD MANUAL: 8.1 % (ref 3–10)
NEUTROPHILS # BLD AUTO: 6.48 10*3/UL (ref 2–8)
NEUTROPHILS NFR BLD MANUAL: 75.5 % (ref 30–85)
OSMOLALITY SERPL CALC.SUM OF ELEC: 276 MOSM/KG (ref 273–304)
PLATELET # BLD AUTO: 395 10*3/UL (ref 130–400)
PMV BLD AUTO: 8.7 FL (ref 7.4–10.4)
POTASSIUM SERPL-SCNC: 4.1 MMOL/L (ref 3.5–5.3)
RBC MORPH BLD: 3.32 10*6/UL (ref 4.7–6.1)
SODIUM SERPL-SCNC: 131 MMOL/L (ref 135–147)
T4 FREE SERPL-MCNC: 1.2 NG/DL (ref 0.9–1.8)
TSH SERPL-ACNC: 1.09 M[IU]/L (ref 0.27–4.2)
WBC # BLD AUTO: 8.59 10*3/UL (ref 4.8–10.8)

## 2021-04-08 ENCOUNTER — OFFICE VISIT CONVERTED (OUTPATIENT)
Dept: ONCOLOGY | Facility: HOSPITAL | Age: 66
End: 2021-04-08
Attending: INTERNAL MEDICINE

## 2021-04-16 ENCOUNTER — CONVERSION ENCOUNTER (OUTPATIENT)
Dept: ONCOLOGY | Facility: HOSPITAL | Age: 66
End: 2021-04-16

## 2021-04-16 LAB
ALBUMIN SERPL-MCNC: 2.9 G/DL (ref 3.5–5)
ALBUMIN/GLOB SERPL: 0.9 {RATIO} (ref 1.4–2.6)
ALP SERPL-CCNC: 143 U/L (ref 56–155)
ALT SERPL-CCNC: 19 U/L (ref 10–40)
ANION GAP SERPL CALC-SCNC: 13 MMOL/L (ref 8–19)
AST SERPL-CCNC: 21 U/L (ref 15–50)
BASOPHILS # BLD AUTO: 0.03 10*3/UL (ref 0–0.2)
BASOPHILS NFR BLD AUTO: 0.3 % (ref 0–3)
BILIRUB SERPL-MCNC: 0.24 MG/DL (ref 0.2–1.3)
BUN SERPL-MCNC: 15 MG/DL (ref 5–25)
BUN/CREAT SERPL: 29 {RATIO} (ref 6–20)
CALCIUM SERPL-MCNC: 9.8 MG/DL (ref 8.7–10.4)
CHLORIDE SERPL-SCNC: 96 MMOL/L (ref 99–111)
CONV ABS IMM GRAN: 0.1 10*3/UL (ref 0–0.54)
CONV CO2: 24 MMOL/L (ref 22–32)
CONV EOSINOPHILS PERCENT BY MANUAL COUNT: 1.7 % (ref 0–7)
CONV IMMATURE GRAN: 0.9 % (ref 0–0.4)
CONV TOTAL PROTEIN: 6.2 G/DL (ref 6.3–8.2)
CREAT UR-MCNC: 0.51 MG/DL (ref 0.7–1.2)
EOSINOPHIL # BLD MANUAL: 0.2 10*3/UL (ref 0–0.7)
ERYTHROCYTE [DISTWIDTH] IN BLOOD BY AUTOMATED COUNT: 16.7 % (ref 11.5–14.5)
ERYTHROCYTE [DISTWIDTH] IN BLOOD BY AUTOMATED COUNT: 46.1 FL
FERRITIN SERPL-MCNC: 596 NG/ML (ref 30–300)
GFR SERPLBLD BASED ON 1.73 SQ M-ARVRAT: >60 ML/MIN/{1.73_M2}
GLOBULIN UR ELPH-MCNC: 3.3 G/DL (ref 2–3.5)
GLUCOSE SERPL-MCNC: 159 MG/DL (ref 70–99)
HBA1C MFR BLD: 8.2 G/DL (ref 14–18)
HCT VFR BLD AUTO: 27.3 % (ref 42–52)
IRON SATN MFR SERPL: 13 % (ref 20–55)
IRON SERPL-MCNC: 25 UG/DL (ref 70–180)
LYMPHOCYTES # BLD AUTO: 1.71 10*3/UL (ref 1–5)
LYMPHOCYTES NFR BLD AUTO: 14.8 % (ref 20–45)
MCH RBC QN AUTO: 22.7 PG (ref 27–31)
MCHC RBC AUTO-ENTMCNC: 30 G/DL (ref 33–37)
MCV RBC AUTO: 75.4 FL (ref 80–96)
MONOCYTES # BLD AUTO: 1.06 10*3/UL (ref 0.2–1.2)
MONOCYTES NFR BLD MANUAL: 9.2 % (ref 3–10)
NEUTROPHILS # BLD AUTO: 8.42 10*3/UL (ref 2–8)
NEUTROPHILS NFR BLD MANUAL: 73.1 % (ref 30–85)
OSMOLALITY SERPL CALC.SUM OF ELEC: 272 MOSM/KG (ref 273–304)
PLATELET # BLD AUTO: 455 10*3/UL (ref 130–400)
PMV BLD AUTO: 8.1 FL (ref 7.4–10.4)
POTASSIUM SERPL-SCNC: 4.1 MMOL/L (ref 3.5–5.3)
RBC MORPH BLD: 3.62 10*6/UL (ref 4.7–6.1)
SODIUM SERPL-SCNC: 129 MMOL/L (ref 135–147)
TIBC SERPL-MCNC: 190 UG/DL (ref 245–450)
TRANSFERRIN SERPL-MCNC: 133 MG/DL (ref 215–365)
WBC # BLD AUTO: 11.52 10*3/UL (ref 4.8–10.8)

## 2021-05-06 ENCOUNTER — OFFICE VISIT CONVERTED (OUTPATIENT)
Dept: ONCOLOGY | Facility: HOSPITAL | Age: 66
End: 2021-05-06
Attending: INTERNAL MEDICINE

## 2021-05-06 LAB
ALBUMIN SERPL-MCNC: 3.3 G/DL (ref 3.5–5)
ALBUMIN/GLOB SERPL: 0.9 {RATIO} (ref 1.4–2.6)
ALP SERPL-CCNC: 129 U/L (ref 56–155)
ALT SERPL-CCNC: 14 U/L (ref 10–40)
ANION GAP SERPL CALC-SCNC: 15 MMOL/L (ref 8–19)
AST SERPL-CCNC: 11 U/L (ref 15–50)
BASOPHILS # BLD AUTO: 0.03 10*3/UL (ref 0–0.2)
BASOPHILS NFR BLD AUTO: 0.2 % (ref 0–3)
BILIRUB SERPL-MCNC: 0.21 MG/DL (ref 0.2–1.3)
BUN SERPL-MCNC: 14 MG/DL (ref 5–25)
BUN/CREAT SERPL: 31 {RATIO} (ref 6–20)
CALCIUM SERPL-MCNC: 9.6 MG/DL (ref 8.7–10.4)
CHLORIDE SERPL-SCNC: 95 MMOL/L (ref 99–111)
CONV ABS IMM GRAN: 0.17 10*3/UL (ref 0–0.54)
CONV CO2: 24 MMOL/L (ref 22–32)
CONV EOSINOPHILS PERCENT BY MANUAL COUNT: 3.6 % (ref 0–7)
CONV IMMATURE GRAN: 1.3 % (ref 0–0.4)
CONV TOTAL PROTEIN: 6.9 G/DL (ref 6.3–8.2)
CREAT UR-MCNC: 0.45 MG/DL (ref 0.7–1.2)
EOSINOPHIL # BLD MANUAL: 0.48 10*3/UL (ref 0–0.7)
ERYTHROCYTE [DISTWIDTH] IN BLOOD BY AUTOMATED COUNT: 18.1 % (ref 11.5–14.5)
ERYTHROCYTE [DISTWIDTH] IN BLOOD BY AUTOMATED COUNT: 50.1 FL
GFR SERPLBLD BASED ON 1.73 SQ M-ARVRAT: >60 ML/MIN/{1.73_M2}
GLOBULIN UR ELPH-MCNC: 3.6 G/DL (ref 2–3.5)
GLUCOSE SERPL-MCNC: 208 MG/DL (ref 70–99)
HBA1C MFR BLD: 8.2 G/DL (ref 14–18)
HCT VFR BLD AUTO: 27.8 % (ref 42–52)
LYMPHOCYTES # BLD AUTO: 1.68 10*3/UL (ref 1–5)
LYMPHOCYTES NFR BLD AUTO: 12.5 % (ref 20–45)
MCH RBC QN AUTO: 22.8 PG (ref 27–31)
MCHC RBC AUTO-ENTMCNC: 29.5 G/DL (ref 33–37)
MCV RBC AUTO: 77.2 FL (ref 80–96)
MONOCYTES # BLD AUTO: 0.87 10*3/UL (ref 0.2–1.2)
MONOCYTES NFR BLD MANUAL: 6.5 % (ref 3–10)
NEUTROPHILS # BLD AUTO: 10.24 10*3/UL (ref 2–8)
NEUTROPHILS NFR BLD MANUAL: 75.9 % (ref 30–85)
OSMOLALITY SERPL CALC.SUM OF ELEC: 277 MOSM/KG (ref 273–304)
PLATELET # BLD AUTO: 487 10*3/UL (ref 130–400)
PMV BLD AUTO: 8.1 FL (ref 7.4–10.4)
POTASSIUM SERPL-SCNC: 3.7 MMOL/L (ref 3.5–5.3)
RBC MORPH BLD: 3.6 10*6/UL (ref 4.7–6.1)
SODIUM SERPL-SCNC: 130 MMOL/L (ref 135–147)
T4 FREE SERPL-MCNC: 1 NG/DL (ref 0.9–1.8)
TSH SERPL-ACNC: 2.2 M[IU]/L (ref 0.27–4.2)
WBC # BLD AUTO: 13.47 10*3/UL (ref 4.8–10.8)

## 2021-05-10 NOTE — H&P
History and Physical      Patient Name: Elver Sanders   Patient ID: [id number]   Sex: Male   YOB: 1955    Primary Care Provider: Daniel Gonzalez MD   Referring Provider: Daniel Gonzalez MD    Visit Date: November 24, 2020    Provider: Rolando Masters MD   Location: [facility]   Location Address: [address] [place] [address]  [id number]   Location Phone: [phone]          Chief Complaint  · Outpatient History & Physical / Surgical Orders  · Colon Consult      History Of Present Illness  Elver Sanders is a 65 year old /White male who presents to the office today as a consult from Danile Gonzalez MD, Maggie Coon MD, and RADHA SHAH.      The patient was referred to have a colonoscopy.  Patient is  known to me as I removed his gallbladder several months ago when he was in the hospital for acute cholecystitis.  The pathology ended up showing gallbladder cancer and the gallbladder did perforate during the procedure.  Patient has since been seen by Dr. Coon with oncology.  He was seen by Dr. Coon locally and by Dr. Shah at the [facility].  Dr. Shah performed a diagnostic laparoscopy and patient indicates there were several intraperitoneal nodules that were biopsied and says Dr. Shah told the patient that he is very concerned the nodules are metastatic cancer.  One of the nodules was apparently located on the surface of the colon.  Patient needs to have a colonoscopy.  He last had a colonoscopy 10 to 12 years ago.  No change in normal bowel function and no family history of colon cancer.       Past Medical History  Disease Name Date Onset Notes   Gallbladder cancer, carcinoma --  --    Gallstones --  --          Past Surgical History  Procedure Name Date Notes   Laparoscopic cholecystectomy --  --          Medication List  Name Date Started Instructions   Norco 7.5-325 mg oral tablet  take 1 tablet by oral route every 6 hours as needed for pain  "        Allergy List  Allergen Name Date Reaction Notes   NO KNOWN DRUG ALLERGIES --  --  --        Allergies Reconciled  Social History  Finding Status Start/Stop Quantity Notes   Smokeless tobacco Former --/-- --  09/01/2020 -    Tobacco Former --/-- --  --          Review of Systems  · Constitutional  o Denies  o : chills, fever  · Gastrointestinal  o Denies  o : nausea, vomiting      Vitals  Date Time BP Position Site L\R Cuff Size HR RR TEMP (F) WT  HT  BMI kg/m2 BSA m2 O2 Sat FR L/min FiO2        11/24/2020 10:02 AM       14  184lbs 2oz 5'  9\" 27.19 2.02             Physical Examination  · Constitutional  o Appearance  o : healthy appearing, alert and in no acute distress, reliable historian, wife present in room  · Head and Face  o Head  o :   § Inspection  § : no visable deformities or lesions  · Eyes  o Conjunctivae  o : clear  o Sclerae  o : clear  · Neck  o Inspection/Palpation  o : normal appearance, no masses, trachea midline  · Respiratory  o Respiratory Effort  o : breathing unlabored, respiratory effort appears normal  o Inspection of Chest  o : normal appearance, no retractions  · Cardiovascular  o Heart  o : regular rate and rhythm  · Gastrointestinal  o Abdominal Examination  o :   § Abdomen  § : soft, nondistended  · Skin and Subcutaneous Tissue  o General Inspection  o : no visible concerning rashes or lesions present  · Neurologic  o Cranial Nerves  o : no obvious motor deficits  o Sensation  o : no obvious sensory deficits  o Gait and Station  o :   § Gait Screening  § : normal gait, able to stand without diffculty  o Cerebellar Function  o : no obvious abnormalities  · Psychiatric  o Judgement and Insight  o : judgment and insight intact  o Mood and Affect  o : mood normal, affect appropriate          Assessment  · Pre-Surgical Orders     V72.84  · Screening for colon cancer     V76.51/Z12.11      Plan  · Orders  o Colonoscopy (32218) - V76.51/Z12.11 - " 11/25/2020  · Medications  o Medications have been Reconciled  o Transition of Care or Provider Policy  · Instructions  o PLAN: Colonoscopy  o Outpatient  o The indications, options, risks, benefits, and expected outcomes of the planned procedure were discussed with the patient and the patient agrees to proceed.   o IV: LR@ 30 ml/hr  o Anesthesia: MAC  o PLEASE SIGN PERMIT FOR: Colonscopy with possible biopsy and possible polypectomy  o Electronically Identified Patient Education Materials Provided Electronically            Electronically Signed by: Rolando Masters MD -Author on November 24, 2020 10:41:01 AM

## 2021-05-13 ENCOUNTER — HOSPITAL ENCOUNTER (OUTPATIENT)
Dept: OTHER | Facility: HOSPITAL | Age: 66
Setting detail: RECURRING SERIES
Discharge: HOME | End: 2021-05-13
Attending: INTERNAL MEDICINE

## 2021-05-13 NOTE — PROGRESS NOTES
"   Progress Note      Patient Name: Elver Sanders   Patient ID: [id number]   Sex: Male   YOB: 1955    Primary Care Provider: Daniel Gonzalez MD   Referring Provider: Daniel Gonzalez MD    Visit Date: September 1, 2020    Provider: Rolando Masters MD   Location: [facility]   Location Address: [address] [place] [address]  [id number]   Location Phone: [phone]          Chief Complaint  · Follow up Surgery      History Of Present Illness  Elver Sanders is a 65 year old /White male who presents today for a postoperative visit.      The patient is here for follow-up after I removed his gallbladder over the weekend when he was in the hospital with acute calculus cholecystitis.  I left a drain.  Patient feels much better than he did before he had the surgery.  The drain only has a small amount of fluid in the bulb but the fluid is bilious appearing.  I flushed the drain with sterile fluid.  It appeared that the drain was at least partially clogged.  Abdominal exam is benign.  I will keep the drain in place and have the patient see me in a few more days from now and then reassess for drain removal.       Vitals  Date Time BP Position Site L\R Cuff Size HR RR TEMP (F) WT  HT  BMI kg/m2 BSA m2 O2 Sat HC       09/01/2020 09:14 AM       12  194lbs 2oz 5'  9\" 28.67 2.07               Assessment  · Postoperative Exam Following Surgery     V67.00      Plan  · Medications  o Medications have been Reconciled  o Transition of Care or Provider Policy  · Instructions  o See Above HPI section.  o Electronically Identified Patient Education Materials Provided Electronically            Electronically Signed by: Rolando Masters MD -Author on September 1, 2020 09:23:59 AM  "

## 2021-05-13 NOTE — PROGRESS NOTES
Progress Note      Patient Name: Elver Sanders   Patient ID: [id number]   Sex: Male   YOB: 1955    Primary Care Provider: Daniel Gonzalez MD   Referring Provider: Daniel Gonzalez MD    Visit Date: September 15, 2020    Provider: Rolando Masters MD   Location: [facility]   Location Address: [address] [place] [address]  [id number]   Location Phone: [phone]          Chief Complaint  · Follow up Surgery      History Of Present Illness  Elver Sanders is a 65 year old /White male who presents today for a postoperative visit.      Patient is here for another follow-up appointment after his gallbladder was removed for acute cholecystitis.  He had a significantly inflamed gallbladder that perforated during the procedure.  There was no evidence of malignancy when the procedure was performed but unexpectedly the pathology result came back gallbladder cancer.  The pathology result was discussed with the patient.  I am going to go ahead and get him set up to see a local oncologist.  As far as the patient's recovery from surgery, he is doing very well and does not have any complaints and his abdominal exam is benign.  This means that the patient is recovering very well from laparoscopic gallbladder removal for acute cholecystitis but unexpected pathology result shows gallbladder cancer and the patient will need to see oncology for a consult.  Referral will be made.       Past Medical History  Disease Name Date Onset Notes   Gallstones --  --          Past Surgical History  Procedure Name Date Notes   Laparoscopic cholecystectomy --  --          Medication List  Name Date Started Instructions   Norco 7.5-325 mg oral tablet  take 1 tablet by oral route every 6 hours as needed for pain         Allergy List  Allergen Name Date Reaction Notes   NO KNOWN DRUG ALLERGIES --  --  --          Social History  Finding Status Start/Stop Quantity Notes   Smokeless tobacco Former --/-- --   "09/01/2020 -    Tobacco Former --/-- --  --          Review of Systems  · Constitutional  o Denies  o : fever, chills  · Cardiovascular  o Denies  o : chest pain on exertion  · Respiratory  o Denies  o : shortness of breath, cough  · Gastrointestinal  o Denies  o : nausea, vomiting      Vitals  Date Time BP Position Site L\R Cuff Size HR RR TEMP (F) WT  HT  BMI kg/m2 BSA m2 O2 Sat HC       09/15/2020 09:24 AM       14  187lbs 8oz 5'  9\" 27.69 2.03               Assessment  · Postoperative Exam Following Surgery     V67.00    Problems Reconciled  Plan  · Medications  o Medications have been Reconciled  o Transition of Care or Provider Policy  · Instructions  o See Above HPI section.            Electronically Signed by: Rolando Masters MD -Author on September 15, 2020 09:38:40 AM  "

## 2021-05-13 NOTE — PROGRESS NOTES
"   Progress Note      Patient Name: Elver Sanders   Patient ID: [id number]   Sex: Male   YOB: 1955    Primary Care Provider: Daniel Gonzalez MD   Referring Provider: Daniel Gonzalez MD    Visit Date: September 3, 2020    Provider: Rolando Masters MD   Location: [facility]   Location Address: [address] [place] [address]  [id number]   Location Phone: [phone]          Chief Complaint  · Follow up Surgery      History Of Present Illness  Elver Sanders is a 65 year old /White male who presents today for a postoperative visit.      drain output no longer bilious and little volume.  abd exam benign.  pt w/o complaints.  drain removed today.  no new issues to address.  continue postop care.       Vitals  Date Time BP Position Site L\R Cuff Size HR RR TEMP (F) WT  HT  BMI kg/m2 BSA m2 O2 Sat HC       09/03/2020 01:11 PM       12  193lbs 16oz 5'  9\" 28.65 2.07               Assessment  · Postoperative Exam Following Surgery     V67.00      Plan  · Medications  o Medications have been Reconciled  o Transition of Care or Provider Policy  · Instructions  o See Above HPI section.  o Electronically Identified Patient Education Materials Provided Electronically            Electronically Signed by: Rolando Masters MD -Author on September 8, 2020 03:12:46 PM  "

## 2021-05-14 VITALS — RESPIRATION RATE: 14 BRPM | WEIGHT: 184.12 LBS | HEIGHT: 69 IN | BODY MASS INDEX: 27.27 KG/M2

## 2021-05-14 VITALS — WEIGHT: 194 LBS | RESPIRATION RATE: 12 BRPM | HEIGHT: 69 IN | BODY MASS INDEX: 28.73 KG/M2

## 2021-05-14 VITALS — BODY MASS INDEX: 27.77 KG/M2 | HEIGHT: 69 IN | RESPIRATION RATE: 14 BRPM | WEIGHT: 187.5 LBS

## 2021-05-14 VITALS — HEIGHT: 69 IN | RESPIRATION RATE: 12 BRPM | BODY MASS INDEX: 28.75 KG/M2 | WEIGHT: 194.12 LBS

## 2021-05-19 VITALS — BODY MASS INDEX: 23.77 KG/M2 | WEIGHT: 151.46 LBS | HEIGHT: 67 IN

## 2021-05-19 PROBLEM — C23: Status: ACTIVE | Noted: 2021-05-19

## 2021-05-27 ENCOUNTER — OFFICE VISIT CONVERTED (OUTPATIENT)
Dept: ONCOLOGY | Facility: HOSPITAL | Age: 66
End: 2021-05-27
Attending: INTERNAL MEDICINE

## 2021-05-27 ENCOUNTER — TRANSCRIBE ORDERS (OUTPATIENT)
Dept: ADMINISTRATIVE | Facility: HOSPITAL | Age: 66
End: 2021-05-27

## 2021-05-27 DIAGNOSIS — C23 MALIGNANT NEOPLASM OF GALLBLADDER (HCC): Primary | ICD-10-CM

## 2021-05-27 LAB
ALBUMIN SERPL-MCNC: 3.2 G/DL (ref 3.5–5)
ALBUMIN/GLOB SERPL: 0.8 {RATIO} (ref 1.4–2.6)
ALP SERPL-CCNC: 122 U/L (ref 56–155)
ALT SERPL-CCNC: 12 U/L (ref 10–40)
ANION GAP SERPL CALC-SCNC: 11 MMOL/L (ref 8–19)
AST SERPL-CCNC: 12 U/L (ref 15–50)
BASOPHILS # BLD AUTO: 0.06 10*3/UL (ref 0–0.2)
BASOPHILS NFR BLD AUTO: 0.5 % (ref 0–3)
BILIRUB SERPL-MCNC: 0.18 MG/DL (ref 0.2–1.3)
BUN SERPL-MCNC: 17 MG/DL (ref 5–25)
BUN/CREAT SERPL: 27 {RATIO} (ref 6–20)
CALCIUM SERPL-MCNC: 9.7 MG/DL (ref 8.7–10.4)
CHLORIDE SERPL-SCNC: 99 MMOL/L (ref 99–111)
CONV ABS IMM GRAN: 0.08 10*3/UL (ref 0–0.54)
CONV CO2: 27 MMOL/L (ref 22–32)
CONV EOSINOPHILS PERCENT BY MANUAL COUNT: 3.9 % (ref 0–7)
CONV IMMATURE GRAN: 0.6 % (ref 0–0.4)
CONV TOTAL PROTEIN: 7.1 G/DL (ref 6.3–8.2)
CREAT UR-MCNC: 0.62 MG/DL (ref 0.7–1.2)
EOSINOPHIL # BLD MANUAL: 0.5 10*3/UL (ref 0–0.7)
ERYTHROCYTE [DISTWIDTH] IN BLOOD BY AUTOMATED COUNT: 19.8 % (ref 11.5–14.5)
ERYTHROCYTE [DISTWIDTH] IN BLOOD BY AUTOMATED COUNT: 58.9 FL
GFR SERPLBLD BASED ON 1.73 SQ M-ARVRAT: >60 ML/MIN/{1.73_M2}
GLOBULIN UR ELPH-MCNC: 3.9 G/DL (ref 2–3.5)
GLUCOSE SERPL-MCNC: 210 MG/DL (ref 70–99)
HBA1C MFR BLD: 9.7 G/DL (ref 14–18)
HCT VFR BLD AUTO: 31.9 % (ref 42–52)
LYMPHOCYTES # BLD AUTO: 1.43 10*3/UL (ref 1–5)
LYMPHOCYTES NFR BLD AUTO: 11.3 % (ref 20–45)
MCH RBC QN AUTO: 24.7 PG (ref 27–31)
MCHC RBC AUTO-ENTMCNC: 30.4 G/DL (ref 33–37)
MCV RBC AUTO: 81.2 FL (ref 80–96)
MONOCYTES # BLD AUTO: 0.93 10*3/UL (ref 0.2–1.2)
MONOCYTES NFR BLD MANUAL: 7.3 % (ref 3–10)
NEUTROPHILS # BLD AUTO: 9.68 10*3/UL (ref 2–8)
NEUTROPHILS NFR BLD MANUAL: 76.4 % (ref 30–85)
OSMOLALITY SERPL CALC.SUM OF ELEC: 284 MOSM/KG (ref 273–304)
PLATELET # BLD AUTO: 480 10*3/UL (ref 130–400)
PMV BLD AUTO: 8.3 FL (ref 7.4–10.4)
POTASSIUM SERPL-SCNC: 3.7 MMOL/L (ref 3.5–5.3)
RBC MORPH BLD: 3.93 10*6/UL (ref 4.7–6.1)
SODIUM SERPL-SCNC: 133 MMOL/L (ref 135–147)
WBC # BLD AUTO: 12.68 10*3/UL (ref 4.8–10.8)

## 2021-05-28 VITALS
SYSTOLIC BLOOD PRESSURE: 109 MMHG | DIASTOLIC BLOOD PRESSURE: 65 MMHG | SYSTOLIC BLOOD PRESSURE: 112 MMHG | OXYGEN SATURATION: 100 % | BODY MASS INDEX: 26.4 KG/M2 | OXYGEN SATURATION: 98 % | HEART RATE: 113 BPM | WEIGHT: 141.09 LBS | TEMPERATURE: 99 F | DIASTOLIC BLOOD PRESSURE: 67 MMHG | SYSTOLIC BLOOD PRESSURE: 136 MMHG | BODY MASS INDEX: 20.84 KG/M2 | HEART RATE: 118 BPM | SYSTOLIC BLOOD PRESSURE: 105 MMHG | TEMPERATURE: 98.9 F | HEART RATE: 89 BPM | RESPIRATION RATE: 18 BRPM | RESPIRATION RATE: 18 BRPM | BODY MASS INDEX: 23.38 KG/M2 | RESPIRATION RATE: 18 BRPM | SYSTOLIC BLOOD PRESSURE: 124 MMHG | TEMPERATURE: 97.8 F | DIASTOLIC BLOOD PRESSURE: 66 MMHG | HEART RATE: 111 BPM | TEMPERATURE: 98.6 F | BODY MASS INDEX: 29.24 KG/M2 | TEMPERATURE: 97.7 F | WEIGHT: 164.9 LBS | OXYGEN SATURATION: 98 % | SYSTOLIC BLOOD PRESSURE: 134 MMHG | DIASTOLIC BLOOD PRESSURE: 80 MMHG | RESPIRATION RATE: 18 BRPM | HEIGHT: 68 IN | BODY MASS INDEX: 27.05 KG/M2 | RESPIRATION RATE: 18 BRPM | HEART RATE: 107 BPM | WEIGHT: 180.34 LBS | TEMPERATURE: 99.8 F | BODY MASS INDEX: 24.35 KG/M2 | WEIGHT: 192.9 LBS | BODY MASS INDEX: 26.63 KG/M2 | TEMPERATURE: 97.5 F | OXYGEN SATURATION: 100 % | HEART RATE: 82 BPM | DIASTOLIC BLOOD PRESSURE: 75 MMHG | OXYGEN SATURATION: 100 % | SYSTOLIC BLOOD PRESSURE: 112 MMHG | RESPIRATION RATE: 18 BRPM | WEIGHT: 178.79 LBS | RESPIRATION RATE: 18 BRPM | WEIGHT: 158.29 LBS | HEART RATE: 122 BPM | DIASTOLIC BLOOD PRESSURE: 83 MMHG | DIASTOLIC BLOOD PRESSURE: 72 MMHG | WEIGHT: 183.2 LBS | OXYGEN SATURATION: 97 % | OXYGEN SATURATION: 100 %

## 2021-05-28 VITALS
WEIGHT: 152.12 LBS | HEART RATE: 103 BPM | TEMPERATURE: 97.3 F | BODY MASS INDEX: 22.46 KG/M2 | OXYGEN SATURATION: 100 % | RESPIRATION RATE: 18 BRPM | SYSTOLIC BLOOD PRESSURE: 121 MMHG | DIASTOLIC BLOOD PRESSURE: 74 MMHG

## 2021-05-28 NOTE — PROGRESS NOTES
Patient: ROYCE MATTHEW     Acct: [id number]     Report: #[id number]  UNIT #: [id number]     : 1955    Encounter Date:2021  PRIMARY CARE: Daniel Gonzalez  ***Signed***  --------------------------------------------------------------------------------------------------------------------  TELEHEALTH NOTE      Past Oncology Illness History      Mr. Matthew comes in today with his wife to discuss the plan of care regarding his    new diagnosis of gallbladder cancer.  He was referred to me by Dr. Rolando Masters,    his general surgeon.            Patient states he is always been in good health and takes no medications.  About    1 month ago he started to have a little bit of epigastric abdominal pain.  Then     the pain got significantly worse and he went to the emergency room.  There and     ultrasound revealed thickening of the gallbladder.  He underwent a simple     cholecystectomy on 2020.  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  The    tumor perforated through the serosa.  Reports indicate the gallbladder ruptured     into the surgical cavity during the procedure.  Dr. Thayer attempted to wash     the peritoneal cavity.            2020: Laparoscopy with biopsies revealed peritoneal carcinomatosis.      Biopsies were positive for metastatic undifferentiated carcinoma,     morphologically consistent with known gallbladder primary.  Possible liver     metastases that were seen on imaging at  were found to be on the surface of     the liver instead.  He was also found to have a mass around his colon.  The     recommendation was for colonoscopy which the patient had recently.  The     colonoscopy was negative for evidence of colon cancer.            C1D1 of Foard/Cis. C2D1 on 21 (Comparison image from 20 at  not available at the time of this     read):         CT chest:      1. No  evidence of metastatic disease within the chest.        2. Elevated right hemidiaphragm with bandlike atelectasis within the right     middle lobe and right lower lobe.             CT abdomen and pelvis:      1. Abnormal low-density perihepatic lesions which may represent capsular     metastasis or mucinous pseudomyxoma peritonei.      2. Infiltrative soft tissue within the gallbladder fossa with suspected     fistulization to the hepatic flexure.      3. Abnormal low-density mass involving the pancreatic head and body without     ductal obstruction or vascular occlusion.      4. Abnormal low-density the lesions along the posterolateral aspect of the cecum    and ascending colon.            History of Present Illness            Chief Complaint: HEPATOBILIARY CANCER             Elver Sanders is presenting for evaluation via Telehealth visit by phone.     Verbal consent obtained before beginning visit.            Provider spent (14) minutes with the patient during telehealth visit.            The following staff were present during the visit: (Ashleigh York, RN)                         Overview of Symptoms      I contacted the patient today to discuss the results of his recent scan.      Unfortunately he had to cancel chemotherapy this week due to the weather.  He     and his wife are both on the phone during the call but she did most of the     talking as he did not feel well.  His biggest complaint at this time is shoulder    pain which is most significant on the right near the back shoulder blade.  I     explained that this is likely secondary to disease progression around the liver.     He is taking hydrocodone intermittently which is helping but is almost out of     the medication.  He has to take it in order to sleep or to get any rest as it is    a constant aching.  With the pain medication his pain is brought  down to a     manageable level.             While I did not have the comparison scan available from  the [facility] I    do have the read.  Also, we were able to discuss his case during our tumor     board.  Dr. Mcclendon from the [facility] was able to bring up the     image during conference and briefly review it.  It does appear there is     significant progression in the gallbladder fossa. 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  When I explained this to     the patient and his wife they did mention that he has had worsening diarrhea     over the past several days.  He has been taking Imodium which may have helped a     bit but not entirely.            I see the patient and his wife are very upset by this news of the worsening     scan.  I explained to them that we will cancel the appointment for chemotherapy     later in the week.  I would recommend that he switch chemotherapy regimens to     FOLFOX or consider no further aggressive treatment.  I asked him to consider     both options as they feel he has no quality of life while getting chemotherapy.     I tried to explain that this could be due to the chemotherapy but also could be     due to the worsening underlying disease.  This cancer is very aggressive and I     am not very optimistic about his chances of having a good response with a new     chemotherapy regimen.  When I offered to refer them to hospice to start the     discussion they decided to hold off at least for a few days.            Most Recent Lab Findings      Laboratory Tests      2/12/21 08:20            2/12/21 08:29            Laboratory Tests            Test       2/12/21      08:20             Magnesium Level       1.76 mg/dL      (1.60-2.30)            Allergies/Medications      Allergies:        Coded Allergies:             NO KNOWN ALLERGIES (Unverified , 2/10/21)      Medications    Last Reconciled on 2/17/21 11:36 by HOLLIE ELLIOTT      oxyCODONE IR (oxyCODONE IR) 5 Mg Tablet      5 MG PO Q4H PRN for PAIN, #90 TAB 0  Refills         Prov: HOLLIE ELLIOTT         2/16/21       Ferrous Gluconate (Ferrous Gluconate*) 324 Mg Tablet      325 MG PO QDAY, #30 TAB 3 Refills         Prov: HOLLIE ELLIOTT         1/21/21       Baclofen (BACLOFEN) 10 Mg Tablet      10 MG PO TID PRN for HICCUPS, #90 TAB 1 Refill         Prov: HOLLIE ELLIOTT         12/17/20       Dronabinol (Dronabinol) 10 Mg Capsule      10 MG PO QDAY, #30 CAP 4 Refills         Prov: HOLLIE ELLIOTT         12/7/20       hydrOXYzine HCL (hydrOXYzine HCL) 25 Mg Tablet      25 MG PO HS PRN for INSOMNIA, #30 TAB 2 Refills         Prov: HOLLIE ELLIOTT         12/7/20       Ondansetron Odt (ONDANSETRON ODT) 8 Mg Tab.rapdis      8 MG PO Q8 for nv, #30 TAB.RAPDIS 3 Refills         Prov: HOLLIE ELLIOTT         12/7/20       OLANZapine (OLANZapine) 10 Mg Tablet      10 MG PO HS for 30 Days, #30 TAB 8 Refills         Prov: HOLLIE ELLIOTT         12/7/20       HYDROcodone-Acetaminophen 5-325 Mg (HYDROcodone-Acetaminophen 5-325 Mg) 1 Each     Tablet      1 TAB PO Q6H PRN for BREAKTHROUGH PAIN, TAB 0 Refills         Reported         11/24/20            Plan/Instructions      Ambulatory Assessment/Plan:        Notes      New Medications      * oxyCODONE IR 5 MG TABLET: 5 MG PO Q4H PRN PAIN #90      Plan/Instructions      Plan Of Care:             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  Patient was referred to the [facility] for further     resection.  Unfortunately he was found to have disseminated disease throughout     the peritoneumon CT and laproscopically.  Biopsy was positive for     undifferentiated adenocarcinoma.  Patient was consented for chemotherapy with     cisplatin and gemcitabine. Repeat staging  CT on 2/4/21 appears to show further     progression.  I will have the prior images from [facility] sent here for direct     comparision and cancel his chemotherapy later this week. My nurse will reach out    to them later  in the week to see if they have come to a decision regarding     further treatment and if they would like to talk with [facility].  I will set up     tentative f/u in 2 weeks.             Nausea: continue olanzapine and Zofran.            Shoulder pain: caused by referred pain from the primary malignancy around the     liver.  Will send a presciption for oxycodone 5mg q4 PRN.  Would like to avoid     excessive amounts of Tylenol with hydrocodone.            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 Patient can continue Imodium as needed.      Codes:  Phone Eval 11-20 mi [phone]            Electronically signed by HOLLIE ELLIOTT  02/17/2021 11:36       Disclaimer: Converted document may not contain table formatting or lab diagrams. Please see [employer] for the authenticated document.

## 2021-05-28 NOTE — PROGRESS NOTES
Patient: ROYCE MATTHEW     Acct: [id number]     Report: #[id number]  UNIT #: [id number]     : 1955    Encounter Date:2020  PRIMARY CARE: Daniel Gonzalez  ***Signed***  --------------------------------------------------------------------------------------------------------------------  NURSE INTAKE      Visit Type      Established Patient Visit            Chief Complaint      HEPATOBILIARY CA            Referring Provider/Copies To      Primary Care Provider:  Daniel Gonzalez      Copies To:   Daniel Gonzalez            History and Present Illness      Past Oncology Illness History      Mr. Matthew comes in today with his wife to discuss the plan of care regarding his    new diagnosis of gallbladder cancer.  He was referred to me by Dr. Rolando Masters,    his general surgeon.            Patient states he is always been in good health and takes no medications.  About    1 month ago he started to have a little bit of epigastric abdominal pain.  Then     the pain got significantly worse and he went to the emergency room.  There and     ultrasound revealed thickening of the gallbladder.  He underwent a simple     cholecystectomy on 2020.  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  The    tumor perforated through the serosa.  Reports indicate the gallbladder ruptured     into the surgical cavity during the procedure.  Dr. Thayer attempted to wash     the peritoneal cavity.            2020: Laparoscopy with biopsies revealed peritoneal carcinomatosis.      Biopsies were positive for metastatic undifferentiated carcinoma,     morphologically consistent with known gallbladder primary.  Possible liver     metastases that were seen on imaging at  were found to be on the surface of     the liver instead.  He was also found to have a mass around his colon.  The     recommendation was for colonoscopy which the patient had recently.   The     colonoscopy was negative for evidence of colon cancer.            C1D1 of [place]/Cis            HPI - Oncology Interim      Patient comes in today for his first cycle of chemotherapy.  He and his wife had    numerous questions about the plan of care and his prognosis which I tried to     address. Some of those questions could not be answered until we see how he is     doing on chemotherapy.             On labs today the patient has a Hgb of 10.7, WBC count 14.4 and plt count of     689.  His plt count was previously 146. For that reason I asked about signs of     inflammation or infection. He denies fevers, pain, cough, SoB, etc.            Clinical Staging      pT3pNx            ECOG Performance Status      1            Most Recent Lab Findings      Laboratory Tests      12/17/20 08:02            12/31/20 08:34            Laboratory Tests            Test       12/17/20      08:02             Magnesium Level       1.81 mg/dL      (1.60-2.30)            PAST, FAMILY   Past Medical History      Past Medical History:  None      Hematology/Oncology (M):  Skin Cancer      Other Hematology History:        GALLBLADDER CANCER            Past Surgical History      Cholecystectomy, Skin Cancer Removal            Family History      Family History:  Breast Cancer (MOTHER)            Social History      Marital Status:        Lives independently:  Yes      Number of Children:  2            Tobacco Use      Tobacco status:  Former smoker      Smoking packs/day:  2      Currently Vaping:  No      Smoking history:  DIPS TOBACCO DAILY            Alcohol Use      Alcohol intake:  0-2 drinks per day            Substance Use      Substance use:  Denies use            REVIEW OF SYSTEMS      General:  Admits: Fatigue;          Denies: Appetite Change, Fever, Night Sweats, Weight Gain, Weight Loss      Eye:  Denies Blurred Vision, Denies Corrective Lenses, Denies Diplopia, Denies     Vision Changes      ENT:  Denies  Headache, Denies Hearing Loss, Denies Hoarseness, Denies Sore     Throat      Cardiovascular:  Denies Chest Pain, Denies Palpitations      Respiratory:  Denies: Cough, Coughing Blood, Productive Cough, Shortness of Air,    Wheezing      Gastrointestinal:  Denies Bloody Stools, Denies Constipation, Denies Diarrhea,     Denies Nausea/Vomiting, Denies Problem Swallowing, Denies Unable to Control     Bowels      Genitourinary:  Denies Blood in Urine, Denies Incontinence, Denies Painful     Urination      Musculoskeletal:  Denies Back Pain, Denies Muscle Pain, Denies Painful Joints      Integumentary:  Denies Itching, Denies Lesions, Denies Rash      Neurologic:  Denies Dizziness, Denies Numbness\Tingling, Denies Seizures      Psychiatric:  Denies Anxiety, Denies Depression      Endocrine:  Denies Cold Intolerance, Denies Heat Intolerance      Hematologic/Lymphatic:  Denies Bruising, Denies Bleeding, Denies Enlarged Lymph     Nodes            VITAL SIGNS AND SCORES      Vitals      Weight 180 lbs 5.380 oz / 81.8 kg      Temperature 98.6 F / 37 C - Temporal      Pulse 113      Respirations 18      Blood Pressure 124/65 Sitting      Pulse Oximetry 97%, RM AIR            Pain Score      Experiencing any pain?:  No      Pain Scale Used:  Numerical      Pain Intensity:  0            Fatigue Score      Experiencing any fatigue?:  Yes      Fatigue (0-10 scale):  3            PREVENTION      Hx Influenza Vaccination:  Yes      Date Influenza Vaccine Given:  Nov 2, 2020      Influenza Vaccine Declined:  No      2 or More Falls in Past Year?:  No      Fall Past Year with Injury?:  No      Hx Pneumococcal Vaccination:  Yes      Encouraged to follow-up with:  PCP regarding preventative exams.      Chart initiated by      SHELL CHAUDHRY MA            ALLERGY/MEDS      Allergies      Coded Allergies:             NO KNOWN ALLERGIES (Unverified , 12/31/20)            Medications      Last Reconciled on 12/7/20 17:53 by HOLLIE ELLIOTT       Baclofen (BACLOFEN) 10 Mg Tablet      10 MG PO TID PRN for HICCUPS, #90 TAB 1 Refill         Prov: HOLLIE ELLIOTT         12/17/20       Dronabinol (Dronabinol) 10 Mg Capsule      10 MG PO QDAY, #30 CAP 4 Refills         Prov: HOLLIE ELLIOTT         12/7/20       hydrOXYzine HCL (hydrOXYzine HCL) 25 Mg Tablet      25 MG PO HS PRN for INSOMNIA, #30 TAB 2 Refills         Prov: HOLLIE ELLIOTT         12/7/20       Ondansetron Odt (ONDANSETRON ODT) 8 Mg Tab.rapdis      8 MG PO Q8 for nv, #30 TAB.RAPDIS 3 Refills         Prov: HOLLIE ELLIOTT         12/7/20       OLANZapine (OLANZapine) 10 Mg Tablet      10 MG PO HS for 30 Days, #30 TAB 8 Refills         Prov: HOLLIE ELLIOTT         12/7/20       HYDROcodone-Acetaminophen 5-325 Mg (HYDROcodone-Acetaminophen 5-325 Mg) 1 Each     Tablet      1 TAB PO Q6H PRN for BREAKTHROUGH PAIN, TAB 0 Refills         Reported         11/24/20      Medications Reviewed:  No Changes made to meds            IMPRESSION/PLAN      Diagnosis      Anemia - D64.9            Gallbladder cancer - C23            Notes      New Diagnostics      * Iron Profile, Routine         Dx: Anemia - D64.9      * Ferritin Level, Routine         Dx: Anemia - D64.9            Plan      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  Patient was referred to the [facility] for further     resection.  Unfortunately he was found to have disseminated disease throughout     the peritoneum.  Biopsy was positive for undifferentiated adenocarcinoma.      Patient was consented for chemotherapy with cisplatin and gemcitabine.  C1D1     today.            Poor appetite and weight loss: perisists. Will continue Marinol. Counseled pt on    ways to increase calorie intake.            Nausea: continue olanzapine and Zofran.            Difficulty sleeping: continue hydroxyzine 25 mg nightly.            Leukocytosis and thrombocytosis: likely secondary to underlying  disease. will     check iron studies to rule out the effect of iron deficiency.            Patient Education      Patient Education Provided:  Yes            Electronically signed by HOLLIE ELLIOTT  01/20/2021 23:23       Disclaimer: Converted document may not contain table formatting or lab diagrams. Please see [employer] for the authenticated document.

## 2021-05-28 NOTE — PROGRESS NOTES
Patient: ROYCE MATTHEW     Acct: [id number]     Report: #[id number]  UNIT #: [id number]     : 1955    Encounter Date:2021  PRIMARY CARE: Daniel Gonzalez  ***Signed***  --------------------------------------------------------------------------------------------------------------------  NURSE INTAKE      Visit Type      Established Patient Visit            Chief Complaint      HEPATOBILIARY CA            Referring Provider/Copies To      Primary Care Provider:  Daniel Gonzalez      Copies To:   Daniel Gonzalez            History and Present Illness      Past Oncology Illness History      Mr. Matthew comes in today with his wife to discuss the plan of care regarding his    new diagnosis of gallbladder cancer.  He was referred to me by Dr. Rolando Masters,    his general surgeon.            Patient states he is always been in good health and takes no medications.  About    1 month ago he started to have a little bit of epigastric abdominal pain.  Then     the pain got significantly worse and he went to the emergency room.  There and     ultrasound revealed thickening of the gallbladder.  He underwent a simple     cholecystectomy on 2020.  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  The    tumor perforated through the serosa.  Reports indicate the gallbladder ruptured     into the surgical cavity during the procedure.  Dr. Thayer attempted to wash     the peritoneal cavity.            2020: Laparoscopy with biopsies revealed peritoneal carcinomatosis.      Biopsies were positive for metastatic undifferentiated carcinoma,     morphologically consistent with known gallbladder primary.  Possible liver     metastases that were seen on imaging at  were found to be on the surface of     the liver instead.  He was also found to have a mass around his colon.  The     recommendation was for colonoscopy which the patient had recently.   The     colonoscopy was negative for evidence of colon cancer.            C1D1 of [place]/Cis. C2D1 on 1/21/21            HPI - Oncology Interim      Patient is here today for his second cycle of [place]/Cis.  He has not had an     significant nausea. He has only taken two tablets of zofran in several weeks. He    is complaining of shoulder pain that could be referred pain from the cancer     site.  He is also having signficant fatiue.             His labs persistently show microcytic anemia. His hgb is 8.9. On 12/31 he had     labs which show his Hgb was, with Tsat of 12% and ferritin 899.            Clinical Staging      pT3pNx            ECOG Performance Status      1            Most Recent Lab Findings      Laboratory Tests      1/7/21 08:20            1/7/21 08:32            Laboratory Tests            Test       1/7/21      08:20             Magnesium Level       1.85 mg/dL      (1.60-2.30)            PAST, FAMILY   Past Medical History      Past Medical History:  None      Hematology/Oncology (M):  Skin Cancer      Other Hematology History:        GALLBLADDER CANCER            Past Surgical History      Cholecystectomy, Skin Cancer Removal            Family History      Family History:  Breast Cancer (MOTHER)            Social History      Marital Status:        Lives independently:  Yes      Number of Children:  2            Tobacco Use      Tobacco status:  Former smoker      Smoking packs/day:  2      Currently Vaping:  No      Smoking history:  DIPS TOBACCO DAILY            Alcohol Use      Alcohol intake:  0-2 drinks per day            Substance Use      Substance use:  Denies use            REVIEW OF SYSTEMS      General:  Admits: Fatigue;          Denies: Appetite Change, Fever, Night Sweats, Weight Gain, Weight Loss      Eye:  Denies Blurred Vision, Denies Corrective Lenses, Denies Diplopia, Denies     Vision Changes      ENT:  Denies Headache, Denies Hearing Loss, Denies Hoarseness, Denies Sore      Throat      Cardiovascular:  Denies Chest Pain, Denies Palpitations      Respiratory:  Denies: Cough, Coughing Blood, Productive Cough, Shortness of Air,    Wheezing      Gastrointestinal:  Denies Bloody Stools, Denies Constipation, Denies Diarrhea,     Denies Nausea/Vomiting, Denies Problem Swallowing, Denies Unable to Control     Bowels      Genitourinary:  Denies Blood in Urine, Denies Incontinence, Denies Painful     Urination      Musculoskeletal:  Denies Back Pain; Admits Muscle Pain; Denies Painful Joints      Other      SHOULDERS      Integumentary:  Denies Itching, Denies Lesions, Denies Rash      Neurologic:  Denies Dizziness, Denies Numbness\Tingling, Denies Seizures      Psychiatric:  Denies Anxiety, Denies Depression      Endocrine:  Denies Cold Intolerance, Denies Heat Intolerance      Hematologic/Lymphatic:  Denies Bruising, Denies Bleeding, Denies Enlarged Lymph     Nodes            VITAL SIGNS AND SCORES      Vitals      Weight 178 lbs 12.689 oz / 81.1 kg      Temperature 99.8 F / 37.67 C - Temporal      Pulse 111      Respirations 18      Blood Pressure 112/67 Sitting      Pulse Oximetry 98%, RM AIR            Pain Score      Experiencing any pain?:  Yes      Pain Scale Used:  Numerical      Pain Intensity:  5            Fatigue Score      Experiencing any fatigue?:  Yes      Fatigue (0-10 scale):  4            EXAM      General: Alert, cooperative, no acute distress but appears very fatigued and     almost defeated      Eyes: Anicteric sclera, PERRLA      Respiratory: CTAB, normal respiratory effort      Abdomen: Normal active bowel sounds, no tenderness, no distention      Cardiovascular: RRR, no murmur, no lower extremity edema      Skin: Normal tone, no rash, no lesions      Psychiatric: Appropriate affect, intact judgment      Neurologic: No focal sensory or motor deficits, no weakness, numbness, dizziness      Musculoskeletal: Normal muscle strength and tone      Extremities: No clubbing,  cyanosis, or deformities            PREVENTION      Hx Influenza Vaccination:  Yes      Date Influenza Vaccine Given:  Nov 2, 2020      Influenza Vaccine Declined:  No      2 or More Falls in Past Year?:  No      Fall Past Year with Injury?:  No      Hx Pneumococcal Vaccination:  Yes      Encouraged to follow-up with:  PCP regarding preventative exams.      Chart initiated by      SHELL CHAUDHRY MA            ALLERGY/MEDS      Allergies      Coded Allergies:             NO KNOWN ALLERGIES (Unverified , 1/21/21)            Medications      Last Reconciled on 2/12/21 01:54 by HOLLIE ELLIOTT      Ferrous Gluconate (Ferrous Gluconate*) 324 Mg Tablet      325 MG PO QDAY, #30 TAB 3 Refills         Prov: HOLLIE ELLIOTT         1/21/21       Baclofen (BACLOFEN) 10 Mg Tablet      10 MG PO TID PRN for HICCUPS, #90 TAB 1 Refill         Prov: HOLLIE ELLIOTT         12/17/20       Dronabinol (Dronabinol) 10 Mg Capsule      10 MG PO QDAY, #30 CAP 4 Refills         Prov: HOLLIE ELLIOTT         12/7/20       hydrOXYzine HCL (hydrOXYzine HCL) 25 Mg Tablet      25 MG PO HS PRN for INSOMNIA, #30 TAB 2 Refills         Prov: HOLLIE ELLIOTT         12/7/20       Ondansetron Odt (ONDANSETRON ODT) 8 Mg Tab.rapdis      8 MG PO Q8 for nv, #30 TAB.RAPDIS 3 Refills         Prov: HOLLIE ELLIOTT         12/7/20       OLANZapine (OLANZapine) 10 Mg Tablet      10 MG PO HS for 30 Days, #30 TAB 8 Refills         Prov: HOLLIE ELLIOTT         12/7/20       HYDROcodone-Acetaminophen 5-325 Mg (HYDROcodone-Acetaminophen 5-325 Mg) 1 Each     Tablet      1 TAB PO Q6H PRN for BREAKTHROUGH PAIN, TAB 0 Refills         Reported         11/24/20      Medications Reviewed:  No Changes made to meds            IMPRESSION/PLAN      Diagnosis      Gallbladder cancer - C23            Shoulder pain - M25.519            Notes      New Medications      * FERROUS GLUCONATE (Ferrous Gluconate*) 324 MG TABLET: 325 MG PO QDAY #30      New Diagnostics      * CT Abd/Pelvis/Chest  W/Contrast, Week         Dx: Gallbladder cancer - C23            Plan      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  Patient was [facility] for [facility]     resection.  Unfortunately he was found to have disseminated disease throughout     the peritoneum.  Biopsy was positive for undifferentiated adenocarcinoma.      Patient was consented for chemotherapy with cisplatin and gemcitabine.  C2D1     today.            Poor appetite and weight loss: perisists. Will continue Marinol. Counseled pt on    ways to increase calorie intake.            Nausea: continue olanzapine and Zofran.            Difficulty sleeping: continue hydroxyzine 25 mg nightly.            Leukocytosis and thrombocytosis: likely secondary to underlying disease and     inflammation            Patient Education      Patient Education Provided:  Yes            Electronically signed by HOLLIE ELLIOTT  02/12/2021 01:54       Disclaimer: Converted document may not contain table formatting or lab diagrams. Please see [email or web address] for the authenticated document.

## 2021-05-28 NOTE — PROGRESS NOTES
Patient: ROYCE MATTHEW     Acct: [id number]     Report: #[id number]  UNIT #: [id number]     : 1955    Encounter Date:2021  PRIMARY CARE: Daniel Gonzalez  ***Signed***  --------------------------------------------------------------------------------------------------------------------  NURSE INTAKE      Visit Type      Established Patient Visit            Chief Complaint      Hepatobiliary Ca tx            Referring Provider/Copies To      Primary Care Provider:  Daniel Gonzalez      Copies To:   Daniel Gonzalez            History and Present Illness      Past Oncology Illness History      Mr. Matthew comes in today with his wife to discuss the plan of care regarding his    new diagnosis of gallbladder cancer.  He was referred to me by Dr. Rolando Masters,    his general surgeon.            Patient states he is always been in good health and takes no medications.  About    1 month ago he started to have a little bit of epigastric abdominal pain.  Then     the pain got significantly worse and he went to the emergency room.  There and     ultrasound revealed thickening of the gallbladder.  He underwent a simple     cholecystectomy on 2020.  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  The    tumor perforated through the serosa.  Reports indicate the gallbladder ruptured     into the surgical cavity during the procedure.  Dr. Thayer attempted to wash     the peritoneal cavity.            2020: Laparoscopy with biopsies revealed peritoneal carcinomatosis.      Biopsies were positive for metastatic undifferentiated carcinoma,     morphologically consistent with known gallbladder primary.  Possible liver     metastases that were seen on imaging at  were found to be on the surface of     the liver instead.  He was also found to have a mass around his colon.  The     recommendation was for colonoscopy which the patient had  recently.  The     colonoscopy was negative for evidence of colon cancer.            C1D1 of Wilkes/Cis. C2D1 on 1/21/21 2/4/21 (Comparison image from 11/4/20 at  not available at the time of this     read):         CT chest:      1. No evidence of metastatic disease within the chest.        2. Elevated right hemidiaphragm with bandlike atelectasis within the right     middle lobe and right lower lobe.             CT abdomen and pelvis:      1. Abnormal low-density perihepatic lesions which may represent capsular     metastasis or mucinous pseudomyxoma peritonei.      2. Infiltrative soft tissue within the gallbladder fossa with suspected     fistulization to the hepatic flexure.      3. Abnormal low-density mass involving the pancreatic head and body without     ductal obstruction or vascular occlusion.      4. Abnormal low-density the lesions along the posterolateral aspect of the cecum    and ascending colon.             [facility] Molecular Report: PDL1 3+ (80%), TMB low (6mut/Mb), MSI-S             C1 Keytruda on 3/2/21            HPI - Oncology Interim      Patient is here today for his second cycle of Keytruda.  Unfortunately he has     been getting worse overall.  His wife says that he is confused on and off.  He     has increasing weakness and an unsteady gait.  He is also continuing to have     fevers.  His highest temp was 1-1.4 which was on the 19th.  She said he is     always very sleepy and is no longer talking or engaging with people in general.     She is also noted some lower extremity swelling.  He also had diarrhea for     several days which lasted till yesterday.            We had a long conversation regarding his prognosis.  I suggested to the patient     and his wife that they strongly consider hospice.  It is unclear if he has     enough time left for an adequate response immunotherapy.  I encouraged him to at    least talk with hospice and consider the options.            Clinical  Staging      pT3pNx            ECOG Performance Status      3            Most Recent Lab Findings      Laboratory Tests      3/23/21 08:54            PAST, FAMILY   Past Medical History      Past Medical History:  None      Hematology/Oncology (M):  Skin Cancer      Other Hematology History:        GALLBLADDER CANCER            Past Surgical History      Cholecystectomy, Skin Cancer Removal            Family History      Family History:  Breast Cancer (MOTHER)            Social History      Marital Status:        Lives independently:  Yes      Number of Children:  2            Tobacco Use      Tobacco status:  Former smoker      Smoking packs/day:  2      Currently Vaping:  No      Smoking history:  DIPS TOBACCO DAILY            Alcohol Use      Alcohol intake:  0-2 drinks per day            Substance Use      Substance use:  Denies use            REVIEW OF SYSTEMS      General:  Admits: Fatigue;          Denies: Appetite Change, Fever, Night Sweats, Weight Gain, Weight Loss      Other      increased weakness very unstable      Eye:  Denies Blurred Vision, Denies Corrective Lenses, Denies Diplopia, Denies     Vision Changes      ENT:  Denies Headache, Denies Hearing Loss, Denies Hoarseness, Denies Sore     Throat      Cardiovascular:  Denies Chest Pain, Denies Palpitations      Respiratory:  Admits: Cough;          Denies: Coughing Blood, Productive Cough, Shortness of Air, Wheezing      Gastrointestinal:  Admits Diarrhea; Denies Bloody Stools, Denies Constipation,     Denies Nausea/Vomiting, Denies Problem Swallowing, Denies Unable to Control     Bowels      Genitourinary:  Denies Blood in Urine, Denies Incontinence, Denies Painful     Urination      Musculoskeletal:  Denies Back Pain, Denies Muscle Pain, Denies Painful Joints      Integumentary:  Denies Itching, Denies Lesions, Denies Rash      Neurologic:  Admits Dizziness; Denies Numbness\Tingling, Denies Seizures      Other      has periods of confusion       Psychiatric:  Denies Anxiety, Denies Depression      Endocrine:  Denies Cold Intolerance, Denies Heat Intolerance      Hematologic/Lymphatic:  Denies Bruising, Denies Bleeding, Denies Enlarged Lymph     Nodes            VITAL SIGNS AND SCORES      Vitals      Weight 158 lbs 4.644 oz / 71.8 kg      Temperature 99.0 F / 37.22 C - Temporal      Pulse 107      Respirations 18      Blood Pressure 105/72 Sitting      Pulse Oximetry 100%, rm air            Pain Score      Experiencing any pain?:  No      Pain Scale Used:  Numerical      Pain Intensity:  0            Fatigue Score      Experiencing any fatigue?:  Yes      Fatigue (0-10 scale):  6            EXAM      General: Alert with eyes open but seldom engages, very fatigued and ill-    appearing      Eyes: Anicteric sclera, PERRLA      Respiratory: normal respiratory effort      Cardiovascular: RRR, no murmur, 1+ lower extremity edema bilaterally      Skin: Very pale greenish skin tone      Psychiatric: Flat affect and very little engagement      Neurologic: No focal sensory or motor deficits      Musculoskeletal: Reduced muscle strength and tone, in a wheelchair      Extremities: No clubbing, cyanosis, or deformities            PREVENTION      Hx Influenza Vaccination:  Yes      Date Influenza Vaccine Given:  Nov 2, 2020      Influenza Vaccine Declined:  No      2 or More Falls in Past Year?:  No      Fall Past Year with Injury?:  No      Hx Pneumococcal Vaccination:  Yes      Encouraged to follow-up with:  PCP regarding preventative exams.      Chart initiated by      milly way ma            ALLERGY/MEDS      Allergies      Coded Allergies:             NO KNOWN ALLERGIES (Unverified , 3/23/21)            Medications      Last Reconciled on 2/17/21 11:36 by HOLLIE ELLIOTT      Dronabinol (Dronabinol) 10 Mg Capsule      10 MG PO BID, CAP         Reported         3/23/21       DULoxetine (Cymbalta) 30 Mg Capsule.dr      30 MG PO QDAY, #30 CAP 0 Refills          Reported         3/23/21       metroNIDAZOLE (metroNIDAZOLE) 500 Mg Tablet      500 MG PO TID for 10 Days, #30 TAB         Prov: HOLLIE ELLIOTT         3/8/21       Ciprofloxacin HCl (Ciprofloxacin HCl) 500 Mg Tablet      500 MG PO BID for 10 Days, #20 TAB 0 Refills         Prov: HOLLIE ELLIOTT         3/8/21       oxyCODONE IR (oxyCODONE IR) 5 Mg Tablet      5 MG PO Q4H PRN for PAIN, #90 TAB 0 Refills         Prov: HOLLIE ELLIOTT         2/16/21       Ferrous Gluconate (Ferrous Gluconate*) 324 Mg Tablet      325 MG PO QDAY, #30 TAB 3 Refills         Prov: HOLLIE ELLIOTT         1/21/21       Baclofen (BACLOFEN) 10 Mg Tablet      10 MG PO TID PRN for HICCUPS, #90 TAB 1 Refill         Prov: HOLLIE ELLIOTT         12/17/20       hydrOXYzine HCL (hydrOXYzine HCL) 25 Mg Tablet      25 MG PO HS PRN for INSOMNIA, #30 TAB 2 Refills         Prov: HOLLIE ELLIOTT         12/7/20       Ondansetron Odt (ONDANSETRON ODT) 8 Mg Tab.rapdis      8 MG PO Q8 for nv, #30 TAB.RAPDIS 3 Refills         Prov: HOLLIE ELLIOTT         12/7/20       OLANZapine (OLANZapine) 10 Mg Tablet      10 MG PO HS for 30 Days, #30 TAB 8 Refills         Prov: HOLLIE ELLIOTT         12/7/20       HYDROcodone-Acetaminophen 5-325 Mg (HYDROcodone-Acetaminophen 5-325 Mg) 1 Each     Tablet      1 TAB PO Q6H PRN for BREAKTHROUGH PAIN, TAB 0 Refills         Reported         11/24/20      Medications Reviewed:  Changes made to meds            IMPRESSION/PLAN      Diagnosis      Notes      New Medications      * DULoxetine (Cymbalta) 30 MG CAPSULE.DR: 30 MG PO QDAY #30      * Dronabinol 10 MG CAPSULE: 10 MG PO BID         Instructions: 1 CAP      Discontinued Medications      * Dronabinol 10 MG CAPSULE: 10 MG PO QDAY #30         Instructions: 1 CAP            Plan      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  Quickly     progressed before surgical resection.  He underwent 2 cycles of cisplatin and     gemcitabine with significant progression.   He is here today for C2 of Keytruda.     Unfortunately he continues to decline clinically.  I have reviewed his labs and     they are adequate for treatment today.  He will get cycle 2 of Keytruda since he    is already here but I encouraged him to transition to hospice.  He and his wife     will discuss it and meet with him.            Leukocytosis: Improved somewhat to 8.6.  He completed ciprofloxacin and Flagyl     for possible microscopic bowel perforation.  He was having diarrhea but this     improved.            Patient Education      Patient Education Provided:  Yes            Electronically signed by HOLLIE ELLIOTT  05/14/2021 14:39       Disclaimer: Converted document may not contain table formatting or lab diagrams. Please see [employer] for the authenticated document.

## 2021-05-28 NOTE — PROGRESS NOTES
Patient: ELVER MATTHEW     Acct: [id number]     Report: #[id number]  UNIT #: [id number]     : 1955    Encounter Date:10/26/2020  PRIMARY CARE: Daniel Gonzalez  ***Signed***  --------------------------------------------------------------------------------------------------------------------  TELEHEALTH NOTE      Past Oncology Illness History      Mr. Matthew comes in today with his wife to discuss the plan of care regarding his    new diagnosis of gallbladder cancer.  He was referred to me by Dr. Rolando Masters,    his general surgeon.            Patient states he is always been in good health and takes no medications.  About    1 month ago he started to have a little bit of epigastric abdominal pain.  Then     the pain got significantly worse and he went to the emergency room.  There and     ultrasound revealed thickening of the gallbladder.  He underwent a simple     cholecystectomy on 2020.  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  The    tumor perforated through the serosa.  Reports indicate the gallbladder ruptured     into the surgical cavity during the procedure.  Dr. Thayer attempted to wash     the peritoneal cavity.            Refer to the [facility] for surgical evaluation and treatment.            History of Present Illness            Chief Complaint: (GALLBLADDER CANCER             Elver Matthew is presenting for evaluation via Telehealth visit by phone.     Verbal consent obtained before beginning visit.            Provider spent (8) minutes with the patient during telehealth visit.            The following staff were present during the visit: (Ashleigh York, KAJAL)                         Overview of Symptoms      I contacted the patient today to follow-up after his initial meeting with Dr. Vaughn.  Prior to his initial visit he was diagnosed with coronavirus but has     since recovered.  He  has completed his quarantine and is already back to work.      He no longer has shortness of breath, cough, or fever.  The surgeon plans to     take him for liver resection and lymph node dissection on November 18.  Patient     says that he is feeling well and has no specific complaints today.  He understan    ds the plan of care and that he will likely have chemotherapy after surgery.            Allergies/Medications      Allergies:        Coded Allergies:             NO KNOWN ALLERGIES (Unverified , 9/22/20)      Medications    Last Reconciled on 10/26/20 17:39 by HOLLIE ELLIOTT      No Active Prescriptions or Reported Meds            Plan/Instructions            * Plan Of Care: (Gallbladder cancer)      * Gallbladder adenocarcinoma was found on routine cholecystectomy.  The       gallbladder ruptured during the procedure.  I referred the patient to the       [facility] for surgical evaluation.  They plan for liver resection      and lymph node dissection on 11/18/2020. I will follow-up with the patient in       the first or second week of December to discuss the results and talk about       adjuvant chemotherapy.            * Chronic conditions reviewed and taken into consideration for today's treatment      plan.      * Patient instructed to seek medical attention urgently for new or worsening       symptoms.      * Patient was educated/instructed on their diagnosis, treatment and medications       prior to discharge from the clinic today.      Codes:  Phone Eval 5-10 min [id number]            Electronically signed by HOLLIE ELLIOTT  10/26/2020 17:40       Disclaimer: Converted document may not contain table formatting or lab diagrams. Please see [employer] [facility] for the authenticated document.

## 2021-05-28 NOTE — PROGRESS NOTES
Patient: ROYCE MATTHEW     Acct: [id number]     Report: #[id number]  UNIT #: [id number]     : 1955    Encounter Date:2021  PRIMARY CARE: Daniel Gonzalez  ***Signed***  --------------------------------------------------------------------------------------------------------------------  NURSE INTAKE      Visit Type      Established Patient Visit            Chief Complaint      GALLBLADDER CA            Referring Provider/Copies To      Primary Care Provider:  Daniel Gonzalez      Copies To:   Daniel Gonzalez            History and Present Illness      Past Oncology Illness History      Mr. Matthew comes in today with his wife to discuss the plan of care regarding his    new diagnosis of gallbladder cancer.  He was referred to me by Dr. Rolando Masters,    his general surgeon.            Patient states he is always been in good health and takes no medications.  About    1 month ago he started to have a little bit of epigastric abdominal pain.  Then     the pain got significantly worse and he went to the emergency room.  There and     ultrasound revealed thickening of the gallbladder.  He underwent a simple     cholecystectomy on 2020.  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  The    tumor perforated through the serosa.  Reports indicate the gallbladder ruptured     into the surgical cavity during the procedure.  Dr. Thayer attempted to wash     the peritoneal cavity.            2020: Laparoscopy with biopsies revealed peritoneal carcinomatosis.      Biopsies were positive for metastatic undifferentiated carcinoma,     morphologically consistent with known gallbladder primary.  Possible liver     metastases that were seen on imaging at  were found to be on the surface of     the liver instead.  He was also found to have a mass around his colon.  The     recommendation was for colonoscopy which the patient had recently.   The     colonoscopy was negative for evidence of colon cancer.            C1D1 of Davis/Cis. C2D1 on 1/21/21 2/4/21 (Comparison image from 11/4/20 at  not available at the time of this     read):         CT chest:      1. No evidence of metastatic disease within the chest.        2. Elevated right hemidiaphragm with bandlike atelectasis within the right     middle lobe and right lower lobe.             CT abdomen and pelvis:      1. Abnormal low-density perihepatic lesions which may represent capsular     metastasis or mucinous pseudomyxoma peritonei.      2. Infiltrative soft tissue within the gallbladder fossa with suspected     fistulization to the hepatic flexure.      3. Abnormal low-density mass involving the pancreatic head and body without     ductal obstruction or vascular occlusion.      4. Abnormal low-density the lesions along the posterolateral aspect of the cecum    and ascending colon.             [facility] Molecular Report: PDL1 3+ (80%), TMB low (6mut/Mb), MSI-S             C1 Keytruda on 3/2/21            HPI - Oncology Interim      The patient comes in today to start Keytruda.  He is feeling very fatigued and     has little quality of life but does not want to stop treatment.  We again     discussed the option of Hospice vs immunotherapy vs chemotherapy.  I reviewed     the [facility] report with them.  He is not a good candidate for chemotherapy due to     functional status. He denies cough, sob, or fever.            Clinical Staging      pT3pNx            ECOG Performance Status      2            PAST, FAMILY   Past Medical History      Past Medical History:  None      Hematology/Oncology (M):  Skin Cancer      Other Hematology History:        GALLBLADDER CANCER            Past Surgical History      Cholecystectomy, Skin Cancer Removal            Family History      Family History:  Breast Cancer (MOTHER)            Social History      Marital Status:        Lives independently:  Yes       Number of Children:  2            Tobacco Use      Tobacco status:  Former smoker      Smoking packs/day:  2      Currently Vaping:  No      Smoking history:  DIPS TOBACCO DAILY            Alcohol Use      Alcohol intake:  0-2 drinks per day            Substance Use      Substance use:  Denies use            REVIEW OF SYSTEMS      General:  Admits: Fatigue, Weight Loss;          Denies: Appetite Change, Fever, Night Sweats, Weight Gain      Eye:  Denies Blurred Vision, Denies Corrective Lenses, Denies Diplopia, Denies     Vision Changes      ENT:  Denies Headache, Denies Hearing Loss, Denies Hoarseness, Denies Sore     Throat      Cardiovascular:  Denies Chest Pain, Denies Palpitations      Respiratory:  Denies: Cough, Coughing Blood, Productive Cough, Shortness of Air,    Wheezing      Gastrointestinal:  Denies Bloody Stools, Denies Constipation, Denies Diarrhea,     Denies Nausea/Vomiting, Denies Problem Swallowing, Denies Unable to Control     Bowels      Genitourinary:  Denies Blood in Urine, Denies Incontinence, Denies Painful     Urination      Musculoskeletal:  Denies Back Pain, Denies Muscle Pain, Denies Painful Joints      Integumentary:  Denies Itching, Denies Lesions, Denies Rash      Neurologic:  Denies Dizziness, Denies Numbness\Tingling, Denies Seizures      Psychiatric:  Denies Anxiety, Denies Depression      Endocrine:  Denies Cold Intolerance, Denies Heat Intolerance      Hematologic/Lymphatic:  Denies Bruising, Denies Bleeding, Denies Enlarged Lymph     Nodes            VITAL SIGNS AND SCORES      Vitals      Weight 164 lbs 14.465 oz / 74.8 kg      Temperature 98.9 F / 37.17 C - Temporal      Pulse 122      Respirations 18      Blood Pressure 112/66 Sitting      Pulse Oximetry 100%, RM AIR            Pain Score      Experiencing any pain?:  No      Pain Scale Used:  Numerical      Pain Intensity:  0            Fatigue Score      Experiencing any fatigue?:  Yes      Fatigue (0-10 scale):  6             EXAM      General: Alert, cooperative, no acute distress, thinner and chronically ill     appearing      Eyes: Anicteric sclera, PERRLA      Respiratory: CTAB, normal respiratory effort      Abdomen: Normal active bowel sounds, no tenderness, no distention      Cardiovascular: RRR, no murmur, no lower extremity edema      Skin: Normal tone, no rash, no lesions      Psychiatric: Appropriate affect, intact judgment      Neurologic: No focal sensory or motor deficits, no weakness, numbness, dizziness      Musculoskeletal: Normal muscle strength and tone      Extremities: No clubbing, cyanosis, or deformities            PREVENTION      Hx Influenza Vaccination:  Yes      Date Influenza Vaccine Given:  Nov 2, 2020      Influenza Vaccine Declined:  No      2 or More Falls in Past Year?:  No      Fall Past Year with Injury?:  No      Hx Pneumococcal Vaccination:  Yes      Encouraged to follow-up with:  PCP regarding preventative exams.      Chart initiated by      SHELL CHAUDHRY MA            ALLERGY/MEDS      Allergies      Coded Allergies:             NO KNOWN ALLERGIES (Unverified , 3/2/21)            Medications      Last Reconciled on 2/17/21 11:36 by HOLLIE ELLIOTT      Capecitabine (Xeloda) 500 Mg Tab      1500 MG PO ASDIR, #84 TAB 5 Refills         Prov: HOLLIE ELLIOTT         2/19/21       oxyCODONE IR (oxyCODONE IR) 5 Mg Tablet      5 MG PO Q4H PRN for PAIN, #90 TAB 0 Refills         Prov: HOLLIE ELLIOTT         2/16/21       Ferrous Gluconate (Ferrous Gluconate*) 324 Mg Tablet      325 MG PO QDAY, #30 TAB 3 Refills         Prov: HOLLIE ELLIOTT         1/21/21       Baclofen (BACLOFEN) 10 Mg Tablet      10 MG PO TID PRN for HICCUPS, #90 TAB 1 Refill         Prov: HOLLIE ELLIOTT         12/17/20       Dronabinol (Dronabinol) 10 Mg Capsule      10 MG PO QDAY, #30 CAP 4 Refills         Prov: HOLLIE ELLIOTT         12/7/20       hydrOXYzine HCL (hydrOXYzine HCL) 25 Mg Tablet      25 MG PO HS PRN for INSOMNIA, #30 TAB  2 Refills         Prov: HOLLIE ELLIOTT         12/7/20       Ondansetron Odt (ONDANSETRON ODT) 8 Mg Tab.rapdis      8 MG PO Q8 for nv, #30 TAB.RAPDIS 3 Refills         Prov: HOLLIE ELLIOTT         12/7/20       OLANZapine (OLANZapine) 10 Mg Tablet      10 MG PO HS for 30 Days, #30 TAB 8 Refills         Prov: HOLLIE ELLIOTT         12/7/20       HYDROcodone-Acetaminophen 5-325 Mg (HYDROcodone-Acetaminophen 5-325 Mg) 1 Each     Tablet      1 TAB PO Q6H PRN for BREAKTHROUGH PAIN, TAB 0 Refills         Reported         11/24/20      Medications Reviewed:  No Changes made to meds            IMPRESSION/PLAN      Diagnosis      Diarrhea - R19.7            Notes      New Diagnostics      * CDTEX CLOSTRIDIUM DIFF TOXIN, Routine         Dx: Diarrhea - R19.7            Plan      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  Quickly     progressed before surgical resection.  He underwent 2 cycles of cisplatin and     gemcitabine with significant progression.  He is here today to start Keytruda as    he is not a good candidate for additional chemotherapy.  His PD-L1 is 80% so he     may have a good response.  I reviewed his labs and they are adequate for     treatment today.            Leukocytosis: Patient's white blood cell count is greater than 12 with an ANC     greater than 17,000.  He has no signs of infection.  At least in part due to     malignancy            Patient Education      Patient Education Provided:  Yes            Electronically signed by ELLIOTTHOLLIE  03/11/2021 22:52       Disclaimer: Converted document may not contain table formatting or lab diagrams. Please see [email or web address] for the authenticated document.

## 2021-05-28 NOTE — PROGRESS NOTES
Patient: ROYCE MATTHEW     Acct: [id number]     Report: #[id number]  UNIT #: [id number]     : 1955    Encounter Date:2021  PRIMARY CARE: Daniel Gonzalez  ***Signed***  --------------------------------------------------------------------------------------------------------------------  TELEHEALTH NOTE      Past Oncology Illness History      Mr. Matthew comes in today with his wife to discuss the plan of care regarding his    new diagnosis of gallbladder cancer.  He was referred to me by Dr. Rolando Masters,    his general surgeon.            Patient states he is always been in good health and takes no medications.  About    1 month ago he started to have a little bit of epigastric abdominal pain.  Then     the pain got significantly worse and he went to the emergency room.  There and     ultrasound revealed thickening of the gallbladder.  He underwent a simple     cholecystectomy on 2020.  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  The    tumor perforated through the serosa.  Reports indicate the gallbladder ruptured     into the surgical cavity during the procedure.  Dr. Thayer attempted to wash     the peritoneal cavity.            2020: Laparoscopy with biopsies revealed peritoneal carcinomatosis.      Biopsies were positive for metastatic undifferentiated carcinoma,     morphologically consistent with known gallbladder primary.  Possible liver     metastases that were seen on imaging at  were found to be on the surface of     the liver instead.  He was also found to have a mass around his colon.  The     recommendation was for colonoscopy which the patient had recently.  The     colonoscopy was negative for evidence of colon cancer.            C1D1 of [place]/Cis. C2D1 on 21 (Comparison image from 20 at  not available at the time of this re    ad):         CT chest:      1. No  evidence of metastatic disease within the chest.        2. Elevated right hemidiaphragm with bandlike atelectasis within the right     middle lobe and right lower lobe.             CT abdomen and pelvis:      1. Abnormal low-density perihepatic lesions which may represent capsular     metastasis or mucinous pseudomyxoma peritonei.      2. Infiltrative soft tissue within the gallbladder fossa with suspected     fistulization to the hepatic flexure.      3. Abnormal low-density mass involving the pancreatic head and body without     ductal obstruction or vascular occlusion.      4. Abnormal low-density the lesions along the posterolateral aspect of the cecum    and ascending colon.             [facility] Molecular Report: PDL1 3+ (80%), TMB low (6mut/Mb), MSI-S             C1 Keytruda on 3/2/21            Enrolled in [facility] the following week.            History of Present Illness            Chief Complaint: (GALLBLADDER CA)            Elver Sanders is presenting for evaluation via Telehealth visit by phone.     Verbal consent obtained before beginning visit.            Provider spent (12) minutes with the patient during telehealth visit.            The following staff were present during the visit: (none)                         Overview of Symptoms      I called to check on the patient today and to make sure he and his wife had no     further needs.  He enrolled in [facility]osp[facility] two weeks ago and has since improved     significantly.  He no longer has daily fevers.  His BM have become normal and     regular.  He no longer has lower extremity swelling. His coughing resolved. He     has no more pain at all. His mind is more clear also.  He is not using the     wheelchair or hospital bed.  He is able to walk to the bathroom on his own and     can even walk around the yard a bit.  His wife is helping him shower but he is     eating better and generally feels much better overall.  She is unsure if his     weight has improved  but his skin color and appearance have.            Allergies/Medications      Allergies:        Coded Allergies:             NO KNOWN ALLERGIES (Unverified , 4/8/21)      Medications    Last Reconciled on 4/8/21 16:55 by HOLLIE ELLIOTT      Dronabinol (Dronabinol) 10 Mg Capsule      10 MG PO BID, CAP         Reported         3/23/21       DULoxetine (Cymbalta) 30 Mg Capsule.dr      30 MG PO QDAY, #30 CAP 0 Refills         Reported         3/23/21       metroNIDAZOLE (metroNIDAZOLE) 500 Mg Tablet      500 MG PO TID for 10 Days, #30 TAB         Prov: HOLLIE ELLIOTT         3/8/21       Ciprofloxacin HCl (Ciprofloxacin HCl) 500 Mg Tablet      500 MG PO BID for 10 Days, #20 TAB 0 Refills         Prov: HOLLIE ELLIOTT         3/8/21       oxyCODONE IR (oxyCODONE IR) 5 Mg Tablet      5 MG PO Q4H PRN for PAIN, #90 TAB 0 Refills         Prov: HOLLIE ELLIOTT         2/16/21       Ferrous Gluconate (Ferrous Gluconate*) 324 Mg Tablet      325 MG PO QDAY, #30 TAB 3 Refills         Prov: HOLLIE ELLIOTT         1/21/21       Baclofen (BACLOFEN) 10 Mg Tablet      10 MG PO TID PRN for HICCUPS, #90 TAB 1 Refill         Prov: HOLLIE ELLIOTT         12/17/20       hydrOXYzine HCL (hydrOXYzine HCL) 25 Mg Tablet      25 MG PO HS PRN for INSOMNIA, #30 TAB 2 Refills         Prov: HOLLIE ELLIOTT         12/7/20       Ondansetron Odt (ONDANSETRON ODT) 8 Mg Tab.rapdis      8 MG PO Q8 for nv, #30 TAB.RAPDIS 3 Refills         Prov: HOLLIE ELLIOTT         12/7/20       OLANZapine (OLANZapine) 10 Mg Tablet      10 MG PO HS for 30 Days, #30 TAB 8 Refills         Prov: HOLLIE ELLIOTT         12/7/20       HYDROcodone-Acetaminophen 5-325 Mg (HYDROcodone-Acetaminophen 5-325 Mg) 1 Each     Tablet      1 TAB PO Q6H PRN for BREAKTHROUGH PAIN, TAB 0 Refills         Reported         11/24/20            Plan/Instructions      66 yo M with:            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 Found on routine cholecystectomy.      Quickly progressed before  surgical resection.  He underwent 2 cycles of     cisplatin and gemcitabine with significant progression.  He got one dose of     Keytruda on 3/2/21 and quickly enrolled in Hospice due to general overall     decline. However, since that then he has significantly improved according to his     wife. He is interested in further treatment so we will get him scheduled to     come back as soon as possible for C2.             Deconditioning:  Patient has gained strength but still sometime uses a bed side     commode. Will send a presciption so that he has one after he leaves Hospice.      Codes:  Phone Eval 5-10 min [id number]            Electronically signed by HOLLIE ELLIOTT  04/08/2021 16:55       Disclaimer: Converted document may not contain table formatting or lab diagrams. Please see [employer] for the authenticated document.

## 2021-05-28 NOTE — PROGRESS NOTES
Patient: ROYCE MATTHEW     Acct: [id number]     Report: #[id number]  UNIT #: [id number]     : 1955    Encounter Date:2021  PRIMARY CARE: Daniel Gonzalez  ***Signed***  --------------------------------------------------------------------------------------------------------------------  NURSE INTAKE      Visit Type      Established Patient Visit            Chief Complaint      HEPATOBILIARY CA            Referring Provider/Copies To      Primary Care Provider:  Daniel Gonzalez      Copies To:   Daniel Gonzalez            History and Present Illness      Past Oncology Illness History      Mr. Matthew comes in today with his wife to discuss the plan of care regarding his    new diagnosis of gallbladder cancer.  He was referred to me by Dr. Rolando Masters,    his general surgeon.            Patient states he is always been in good health and takes no medications.  About    1 month ago he started to have a little bit of epigastric abdominal pain.  Then     the pain got significantly worse and he went to the emergency room.  There and     ultrasound revealed thickening of the gallbladder.  He underwent a simple     cholecystectomy on 2020.  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  The    tumor perforated through the serosa.  Reports indicate the gallbladder ruptured     into the surgical cavity during the procedure.  Dr. Thayer attempted to wash     the peritoneal cavity.            2020: Laparoscopy with biopsies revealed peritoneal carcinomatosis.      Biopsies were positive for metastatic undifferentiated carcinoma,     morphologically consistent with known gallbladder primary.  Possible liver     metastases that were seen on imaging at  were found to be on the surface of     the liver instead.  He was also found to have a mass around his colon.  The     recommendation was for colonoscopy which the patient had recently.   The     colonoscopy was negative for evidence of colon cancer.            C1D1 of [place]/Cis. C2D1 on 1/21/21 2/4/21 (Comparison image from 11/4/20 at  not available at the time of this     read):         CT chest:      1. No evidence of metastatic disease within the chest.        2. Elevated right hemidiaphragm with bandlike atelectasis within the right     middle lobe and right lower lobe.             CT abdomen and pelvis:      1. Abnormal low-density perihepatic lesions which may represent capsular     metastasis or mucinous pseudomyxoma peritonei.      2. Infiltrative soft tissue within the gallbladder fossa with suspected     fistulization to the hepatic flexure.      3. Abnormal low-density mass involving the pancreatic head and body without     ductal obstruction or vascular occlusion.      4. Abnormal low-density the lesions along the posterolateral aspect of the cecum    and ascending colon.             [facility] Molecular Report: PDL1 3+ (80%), TMB low (6mut/Mb), MSI-S             C1 Keytruda on 3/2/21            Enrolled in [facility] the following week.            HPI - Oncology Interim      Here for f/u gallbladder cancer       Due for cycle 4 Keytruda today      Patient reports he had some tremors post last treatment but they resolved      His appetite has improved      He is complaining of some decreased hearing and popping in his left ear            Clinical Staging      [id number]            ECOG Performance Status      2            PAST, FAMILY   Past Medical History      Past Medical History:  None      Hematology/Oncology (M):  Anemia, Skin Cancer      Other Hematology History:        GALLBLADDER CANCER            Past Surgical History      Cholecystectomy, Skin Cancer Removal            Family History      Family History:  Breast Cancer (MOTHER)            Social History      Marital Status:        Lives independently:  Yes      Number of Children:  2            Tobacco Use       Tobacco status:  Former smoker      Smoking packs/day:  2      Currently Vaping:  No      Smoking history:  DIPS TOBACCO DAILY            Alcohol Use      Alcohol intake:  0-2 drinks per day            Substance Use      Substance use:  Denies use            REVIEW OF SYSTEMS      General:  Admits: Fatigue;          Denies: Appetite Change, Fever, Night Sweats, Weight Gain, Weight Loss      Eye:  Denies Blurred Vision, Denies Corrective Lenses, Denies Diplopia, Denies     Vision Changes      ENT:  Denies Headache, Denies Hearing Loss, Denies Hoarseness, Denies Sore     Throat      Other      LEFT EAR PRESSURE- POPS TO CLEAR      Cardiovascular:  Denies Chest Pain, Denies Palpitations      Respiratory:  Denies: Cough, Coughing Blood, Productive Cough, Shortness of Air,    Wheezing      Gastrointestinal:  Denies Bloody Stools, Denies Constipation, Denies Diarrhea,     Denies Nausea/Vomiting, Denies Problem Swallowing, Denies Unable to Control     Bowels      Genitourinary:  Denies Blood in Urine, Denies Incontinence, Denies Painful     Urination      Musculoskeletal:  Denies Back Pain, Denies Muscle Pain, Denies Painful Joints      Integumentary:  Denies Itching, Denies Lesions, Denies Rash      Neurologic:  Denies Dizziness, Denies Numbness\Tingling, Denies Seizures      Other      SOME TREMORS IN HANDS (BLE)      Psychiatric:  Denies Anxiety, Denies Depression      Endocrine:  Denies Cold Intolerance, Denies Heat Intolerance      Hematologic/Lymphatic:  Denies Bruising, Denies Bleeding, Denies Enlarged Lymph     Nodes            VITAL SIGNS AND SCORES      Vitals      Weight 152 lbs 1.878 oz / 69. kg      Temperature 97.3 F / 36.28 C - Temporal      Pulse 103      Respirations 18      Blood Pressure 121/74      Pulse Oximetry 100%, RM AIR            Pain Score      Experiencing any pain?:  No      Pain Scale Used:  Numerical      Pain Intensity:  0            Fatigue Score      Experiencing any fatigue?:  Yes       Fatigue (0-10 scale):  5            EXAM      General Appearance:  Positive for: Alert, Oriented x3, Cooperative      Eye:  Positive for: Anicteric Sclerae      HEENT:  Positive for: Oropharynx clear, Thrush      Neck:  Positive for: Supple      Respiratory:  Positive for: CTAB      Abdomen/Gastro:  Positive for: Normal Active Bowel Sounds, Soft      Cardiovascular:  Positive for: RRR      Psychiatric:  Positive for: AAO X 3      Musculoskeletal:  Positive for: Full ROM Lower Extremety, Full ROM Upper     Extremety      Lower Extremities:  Positive for: Edema      Lymphatic:  Positive for: Axillary, Cervical            PREVENTION      Hx Influenza Vaccination:  Yes      Date Influenza Vaccine Given:  Nov 2, 2020      Influenza Vaccine Declined:  No      2 or More Falls in Past Year?:  No      Fall Past Year with Injury?:  No      Hx Pneumococcal Vaccination:  Yes      Encouraged to follow-up with:  PCP regarding preventative exams.      Chart initiated by      VICKY SALTER MA            ALLERGY/MEDS      Allergies      Coded Allergies:             NO KNOWN ALLERGIES (Unverified , 5/6/21)            Medications      Last Reconciled on 4/8/21 16:55 by HOLLIE ELLIOTT      DULoxetine (Cymbalta) 30 Mg Capsule.dr      30 MG PO QDAY, #30 CAP 3 Refills         Prov: HOLLIE ELLIOTT         4/16/21       LORazepam (LORazepam) 0.5 Mg Tablet      0.5 MG PO Q6H, TAB         Reported         4/16/21       Sennosides/Docusate Sod 8.6/50 MG (Sennosides/Docusate Sod 8.6/50 MG) 1 Tab     Tablet      1 TAB PO QDAY, TAB         Reported         4/16/21       Dronabinol (Dronabinol) 10 Mg Capsule      10 MG PO BID, CAP         Reported         3/23/21       oxyCODONE IR (oxyCODONE IR) 5 Mg Tablet      5 MG PO Q4H PRN for PAIN, #90 TAB 0 Refills         Prov: HOLLIE ELLIOTT         2/16/21       Ferrous Gluconate (Ferrous Gluconate*) 324 Mg Tablet      325 MG PO QDAY, #30 TAB 3 Refills         Prov: HOLLIE ELLIOTT         1/21/21        Baclofen (BACLOFEN) 10 Mg Tablet      10 MG PO TID PRN for HICCUPS, #90 TAB 1 Refill         Prov: HOLLIE ELLIOTT         12/17/20       hydrOXYzine HCL (hydrOXYzine HCL) 25 Mg Tablet      25 MG PO HS PRN for INSOMNIA, #30 TAB 2 Refills         Prov: HOLLIE ELLIOTT         12/7/20       Ondansetron Odt (ONDANSETRON ODT) 8 Mg Tab.rapdis      8 MG PO Q8 for nv, #30 TAB.RAPDIS 3 Refills         Prov: HOLLIE ELLIOTT         12/7/20       OLANZapine (OLANZapine) 10 Mg Tablet      10 MG PO HS for 30 Days, #30 TAB 8 Refills         Prov: HOLLIE ELLIOTT         12/7/20      Medications Reviewed:  No Changes made to meds            IMPRESSION/PLAN      Impression      gallbladder adenoca      anemia            Plan      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 Found on routine cholecystectomy.      Quickly progressed before surgical resection.  He underwent 2 cycles of     cisplatin and gemcitabine with significant progression.  He got first dose of     Keytruda on 3/2/21       Labs and clinical parameters ok to continue treatment with keytruda            2 Anemia( JULISA)- Iv iron to be given today      OV and f/u 3 weeks            3 L ear wax noted- trial of debrox            Patient Education      Patient Education Provided:  Yes            Electronically signed by Julia Schrader  05/06/2021 10:14       Disclaimer: Converted document may not contain table formatting or lab diagrams. Please see [employer] for the authenticated document.

## 2021-05-28 NOTE — PROGRESS NOTES
Patient: ROYCE MATTHEW     Acct: [id number]     Report: #[id number]  UNIT #: [id number]     : 1955    Encounter Date:2020  PRIMARY CARE: Daniel Gonzalez  ***Signed***  --------------------------------------------------------------------------------------------------------------------  NURSE INTAKE      Visit Type      Established Patient Visit            Chief Complaint      GALLBLADDER CA            Referring Provider/Copies To      Primary Care Provider:  Daniel Gonzalez      Copies To:   Daniel Gonzalez            History and Present Illness      Past Oncology Illness History      Mr. Matthew comes in today with his wife to discuss the plan of care regarding his    new diagnosis of gallbladder cancer.  He was referred to me by Dr. Rolando Masters,    his general surgeon.            Patient states he is always been in good health and takes no medications.  About    1 month ago he started to have a little bit of epigastric abdominal pain.  Then     the pain got significantly worse and he went to the emergency room.  There and     ultrasound revealed thickening of the gallbladder.  He underwent a simple     cholecystectomy on 2020.  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  The    tumor perforated through the serosa.  Reports indicate the gallbladder ruptured     into the surgical cavity during the procedure.  Dr. Thayer attempted to wash     the peritoneal cavity.            2020: Laparoscopy with biopsies revealed peritoneal carcinomatosis.      Biopsies were positive for metastatic undifferentiated carcinoma,     morphologically consistent with known gallbladder primary.  Possible liver     metastases that were seen on imaging at  were found to be on the surface of     the liver instead.  He was also found to have a mass around his colon.  The     recommendation was for colonoscopy which the patient had recently.   The     colonoscopy was negative for evidence of colon cancer.            HPI - Oncology Interim      Patient comes in today with his wife to discuss the plan of care.  She was     somewhat upset that they do not feel that they have had a consistent plan.  I     tried to explain to her how things have changed as we have got more formation.      He did not have the original surgery that was planned because more disease was     found throughout his peritoneum.  I explained how the treatment plan changed as     a result of this.  He will now need 3 to 6 months of chemotherapy with plans to     undergo HIPEC if he has a good response.  His wife had many good questions     regarding the plan of care, how long chemotherapy is, what his life expectancy     is, and what the expected side effects are.  Both were very upset to learn that     his prognosis is very poor.            He currently has few side effects except for lack of appetite.  We discussed s    tarting an appetite stimulating medication.  He is also concerned that he will     develop nausea with the chemotherapy.  Since these are severe nausea inducing     medic we will start him on olanzapine.  Then his wife right up the fact that he     is not sleeping well.  She would like him to start some type of sleep medication    even if it is Benadryl.            Clinical Staging      pT3pNx            ECOG Performance Status      1            PAST, FAMILY   Past Medical History      Past Medical History:  None      Hematology/Oncology (M):  Skin Cancer      Other Hematology History:        GALLBLADDER CANCER            Past Surgical History      Cholecystectomy, Skin Cancer Removal            Family History      Family History:  Breast Cancer (MOTHER)            Social History      Marital Status:        Lives independently:  Yes      Number of Children:  2            Tobacco Use      Tobacco status:  Former smoker      Smoking packs/day:  2      Currently  Vaping:  No      Smoking history:  DIPS TOBACCO DAILY            Alcohol Use      Alcohol intake:  0-2 drinks per day            Substance Use      Substance use:  Denies use            REVIEW OF SYSTEMS      General:  Admits: Appetite Change, Fatigue, Weight Loss;          Denies: Fever, Night Sweats, Weight Gain      Other      HARD TIME SLEEPING      Eye:  Denies Blurred Vision, Denies Corrective Lenses, Denies Diplopia, Denies     Vision Changes      ENT:  Denies Headache, Denies Hearing Loss, Denies Hoarseness, Denies Sore     Throat      Cardiovascular:  Denies Chest Pain, Denies Palpitations      Respiratory:  Denies: Cough, Coughing Blood, Productive Cough, Shortness of Air,    Wheezing      Gastrointestinal:  Denies Bloody Stools, Denies Constipation, Denies Diarrhea,     Denies Nausea/Vomiting, Denies Problem Swallowing, Denies Unable to Control B    owels      Genitourinary:  Denies Blood in Urine, Denies Incontinence, Denies Painful     Urination      Musculoskeletal:  Denies Back Pain, Denies Muscle Pain, Denies Painful Joints      Integumentary:  Denies Itching, Denies Lesions, Denies Rash      Neurologic:  Denies Dizziness, Denies Numbness\Tingling, Denies Seizures      Psychiatric:  Denies Anxiety, Denies Depression      Endocrine:  Denies Cold Intolerance, Denies Heat Intolerance      Hematologic/Lymphatic:  Denies Bruising, Denies Bleeding, Denies Enlarged Lymph     Nodes            VITAL SIGNS AND SCORES      Vitals      Weight 183 lbs 3.236 oz / 83.1 kg      Temperature 97.8 F / 36.56 C - Temporal      Pulse 89      Respirations 18      Blood Pressure 134/83 Sitting, Left Arm      Pulse Oximetry 98%, RM AIR            Pain Score      Experiencing any pain?:  No      Pain Scale Used:  Numerical      Pain Intensity:  0            Fatigue Score      Experiencing any fatigue?:  Yes      Fatigue (0-10 scale):  3            EXAM      General: Alert, cooperative, no acute distress, thinner but  well-appearing      Eyes: Anicteric sclera, PERRLA      Respiratory: CTAB, normal respiratory effort      Abdomen: Normal active bowel sounds, no tenderness, no distention      Cardiovascular: RRR, no murmur, no peripheral edema      Skin: Normal tone, well-healed incision sites      Psychiatric: Appropriate affect, intact judgment, very quiet and calm      Neurologic: No focal sensory or motor deficits, no weakness, numbness, dizziness      Musculoskeletal: Normal muscle strength, normal muscle tone      Extremities: No clubbing, cyanosis, or deformities            PREVENTION      Hx Influenza Vaccination:  Yes      Date Influenza Vaccine Given:  Nov 2, 2020      Influenza Vaccine Declined:  No      2 or More Falls in Past Year?:  No      Fall Past Year with Injury?:  No      Hx Pneumococcal Vaccination:  Yes      Encouraged to follow-up with:  PCP regarding preventative exams.      Chart initiated by      SHELL CHAUDHRY MA            ALLERGY/MEDS      Allergies      Coded Allergies:             NO KNOWN ALLERGIES (Unverified , 12/7/20)            Medications      Last Reconciled on 12/7/20 17:53 by HOLLIE ELLIOTT      hydrOXYzine HCL (hydrOXYzine HCL) 25 Mg Tablet      25 MG PO HS PRN for INSOMNIA, #30 TAB 2 Refills         Prov: HOLLIE ELLIOTT         12/7/20       Ondansetron Odt (ONDANSETRON ODT) 8 Mg Tab.rapdis      8 MG PO Q8 for nv, #30 TAB.RAPDIS 3 Refills         Prov: HOLLIE ELLIOTT         12/7/20       OLANZapine (OLANZapine) 10 Mg Tablet      10 MG PO HS for 30 Days, #30 TAB 8 Refills         Prov: HOLLIE ELLIOTT         12/7/20       HYDROcodone-Acetaminophen 5-325 Mg (HYDROcodone-Acetaminophen 5-325 Mg) 1 Each     Tablet      1 TAB PO Q6H PRN for BREAKTHROUGH PAIN, TAB 0 Refills         Reported         11/24/20      Medications Reviewed:  No Changes made to meds            IMPRESSION/PLAN      Diagnosis      Gallbladder cancer - C23            Notes      New Medications      * OLANZapine 10 MG TABLET: 10  MG PO HS 30 Days #30         Dx: Gallbladder cancer - C23      * ONDANSETRON ODT 8 MG TAB.RAPDIS: 8 MG PO Q8 nv #30         Dx: Gallbladder cancer - C23      * hydrOXYzine HCL 25 MG TABLET: 25 MG PO HS PRN INSOMNIA #30      New Diagnostics      * CBC With Auto Diff, Routine         Dx: Gallbladder cancer - C23      * Comp Metabolic Panel, Routine         Dx: Gallbladder cancer - C23            Plan      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  Patient was referred to the [facility] for further     resection.  Unfortunately he was found to have disseminated disease throughout     the peritoneum.  Biopsy was positive for undifferentiated adenocarcinoma.      Patient was consented for chemotherapy with cisplatin and gemcitabine.  He will     get these both on days 1 and days 8 of a q. 21-day cycle.  If he can be     scheduled later in the week then he will not need to see me prior to the first     appointment.  We will plan to check labs today and proceed with chemotherapy     soon as possible.  His port was placed at .            Poor appetite and weight loss: We will start the patient on Marinol as     mirtazapine would likely cause side effects given that it is similar to other     medications he will need.            Nausea: Patient has very little nausea at this point but is concerned about     development of significant nausea during chemotherapy.  We will plan nausea     prophylaxis with olanzapine and Zofran.            Difficulty sleeping: Patient has had a difficult time sleeping since his cancer     diagnosis.  This is likely in large part due to anxiety.  I will start him on     hydroxyzine 25 mg nightly.            Patient Education      Patient Education Provided:  Yes            Electronically signed by HOLLIE ELLIOTT  12/07/2020 17:54       Disclaimer: Converted document may not contain table formatting or lab diagrams. Please see  [employer] for the authenticated document.

## 2021-06-06 VITALS
HEART RATE: 100 BPM | TEMPERATURE: 97.8 F | SYSTOLIC BLOOD PRESSURE: 107 MMHG | OXYGEN SATURATION: 100 % | BODY MASS INDEX: 24.63 KG/M2 | WEIGHT: 156.09 LBS | RESPIRATION RATE: 20 BRPM | DIASTOLIC BLOOD PRESSURE: 68 MMHG

## 2021-06-06 NOTE — PROGRESS NOTES
Patient: ROYCE MATTHEW     Acct: [id number]     Report: #[id number]  UNIT #: [id number]     : 1955    Encounter Date:2021  PRIMARY CARE: Daniel Gonzalez  ***Signed***  --------------------------------------------------------------------------------------------------------------------  NURSE INTAKE      Visit Type      Established Patient Visit            Chief Complaint      HEPATOBILIARY CA            Referring Provider/Copies To      Primary Care Provider:  Daniel Gonzalez      Copies To:   Daniel Gonzalez            History and Present Illness      Past Oncology Illness History      Mr. Matthew comes in today with his wife to discuss the plan of care regarding his    new diagnosis of gallbladder cancer.  He was referred to me by Dr. Rolando Masters,    his general surgeon.            Patient states he is always been in good health and takes no medications.  About    1 month ago he started to have a little bit of epigastric abdominal pain.  Then     the pain got significantly worse and he went to the emergency room.  There and     ultrasound revealed thickening of the gallbladder.  He underwent a simple     cholecystectomy on 2020.  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  The    tumor perforated through the serosa.  Reports indicate the gallbladder ruptured     into the surgical cavity during the procedure.  Dr. Thayer attempted to wash     the peritoneal cavity.            2020: Laparoscopy with biopsies revealed peritoneal carcinomatosis.      Biopsies were positive for metastatic undifferentiated carcinoma,     morphologically consistent with known gallbladder primary.  Possible liver     metastases that were seen on imaging at  were found to be on the surface of     the liver instead.  He was also found to have a mass around his colon.  The     recommendation was for colonoscopy which the patient had recently.   The     colonoscopy was negative for evidence of colon cancer.            C1D1 of Shannon/Cis. C2D1 on 1/21/21 2/4/21 (Comparison image from 11/4/20 at  not available at the time of this     read):         CT chest:      1. No evidence of metastatic disease within the chest.        2. Elevated right hemidiaphragm with bandlike atelectasis within the right     middle lobe and right lower lobe.             CT abdomen and pelvis:      1. Abnormal low-density perihepatic lesions which may represent capsular     metastasis or mucinous pseudomyxoma peritonei.      2. Infiltrative soft tissue within the gallbladder fossa with suspected     fistulization to the hepatic flexure.      3. Abnormal low-density mass involving the pancreatic head and body without     ductal obstruction or vascular occlusion.      4. Abnormal low-density the lesions along the posterolateral aspect of the cecum    and ascending colon.             [facility] Report: PDL1 3+ (80%), TMB low (6mut/Mb), MSI-S             C1 Keytruda on 3/2/21            HPI - Oncology Interim      Patient comes in today for cycle 5 of Keytruda.  He is with his wife he     continues to be very happy over his progress.  She said he is doing well and     interacting with the family.  He is eating fairly well and able to take care of     his own ADLs.  She said her only concern is that he does not drink a lot of     water.  He is drinking almost all Sprite.  He does have some left hip pain with     walking that has been gradual in onset.            Clinical Staging      pT3pNx            ECOG Performance Status      2            PAST, FAMILY   Past Medical History      Past Medical History:  None      Hematology/Oncology (M):  Skin Cancer      Other Hematology History:        GALLBLADDER CANCER            Past Surgical History      Cholecystectomy, Skin Cancer Removal            Family History      Family History:  Breast Cancer (MOTHER)            Social  History      Marital Status:        Lives independently:  Yes      Number of Children:  2            Tobacco Use      Tobacco status:  Former smoker      Smoking packs/day:  2      Currently Vaping:  No      Smoking history:  DIPS TOBACCO DAILY            Alcohol Use      Alcohol intake:  0-2 drinks per day            Substance Use      Substance use:  Denies use            REVIEW OF SYSTEMS      General:  Admits: Fatigue;          Denies: Appetite Change, Fever, Night Sweats, Weight Gain, Weight Loss      Eye:  Denies Blurred Vision, Denies Corrective Lenses, Denies Diplopia, Denies     Vision Changes      ENT:  Denies Headache, Denies Hearing Loss, Denies Hoarseness, Denies Sore     Throat      Cardiovascular:  Denies Chest Pain, Denies Palpitations      Respiratory:  Denies: Cough, Coughing Blood, Productive Cough, Shortness of Air,    Wheezing      Gastrointestinal:  Denies Bloody Stools, Denies Constipation, Denies Diarrhea,     Denies Nausea/Vomiting, Denies Problem Swallowing, Denies Unable to Control     Bowels      Genitourinary:  Denies Blood in Urine, Denies Incontinence, Denies Painful     Urination      Musculoskeletal:  Denies Back Pain; Admits Painful Joints      Integumentary:  Denies Itching, Denies Lesions, Denies Rash      Neurologic:  Denies Dizziness, Denies Numbness\Tingling, Denies Seizures      Psychiatric:  Denies Anxiety, Denies Depression      Endocrine:  Denies Cold Intolerance, Denies Heat Intolerance      Hematologic/Lymphatic:  Denies Bruising, Denies Bleeding, Denies Enlarged Lymph     Nodes            VITAL SIGNS AND SCORES      Vitals      Weight 156 lbs 1.371 oz / 70.8 kg      Temperature 97.8 F / 36.56 C - Temporal      Pulse 100      Respirations 20      Blood Pressure 107/68 Sitting      Pulse Oximetry 100%, RM AIR            Pain Score      Experiencing any pain?:  Yes      Pain Scale Used:  Numerical      Pain Intensity:  3            Fatigue Score      Experiencing any  fatigue?:  Yes      Fatigue (0-10 scale):  5            EXAM      General: Alert, cooperative, no acute distress      Eyes: Anicteric sclera, PERRLA      Respiratory: CTAB, normal respiratory effort      Abdomen: Normal active bowel sounds, no tenderness, no distention      Cardiovascular: RRR, no murmur, no lower extremity edema      Skin: Normal tone, no rash, no lesions      Psychiatric: Appropriate affect but very quiet, hard to assess mood      Neurologic: No focal sensory or motor deficits, no weakness, numbness, dizziness      Musculoskeletal: Reduced muscle strength and tone, but able to ambulate on his     own      Extremities: No clubbing, cyanosis, or deformities            PREVENTION      Hx Influenza Vaccination:  Yes      Date Influenza Vaccine Given:  Nov 2, 2020      Influenza Vaccine Declined:  No      2 or More Falls in Past Year?:  No      Fall Past Year with Injury?:  No      Hx Pneumococcal Vaccination:  Yes      Encouraged to follow-up with:  PCP regarding preventative exams.      Chart initiated by      DOMINIQUE MOORE CMA            ALLERGY/MEDS      Allergies      Coded Allergies:             NO KNOWN ALLERGIES (Unverified , 5/27/21)            Medications      Last Reconciled on 5/31/21 22:42 by HOLLIE ELLIOTT      DULoxetine (Cymbalta) 30 Mg Capsule.dr      30 MG PO QDAY, #30 CAP 3 Refills         Prov: HOLLIE ELLIOTT         4/16/21       LORazepam (LORazepam) 0.5 Mg Tablet      0.5 MG PO Q6H, TAB         Reported         4/16/21       Sennosides/Docusate Sod 8.6/50 MG (Sennosides/Docusate Sod 8.6/50 MG) 1 Tab     Tablet      1 TAB PO QDAY, TAB         Reported         4/16/21       Dronabinol (Dronabinol) 10 Mg Capsule      10 MG PO BID, CAP         Reported         3/23/21       oxyCODONE IR (oxyCODONE IR) 5 Mg Tablet      5 MG PO Q4H PRN for PAIN, #90 TAB 0 Refills         Prov: HOLLIE ELLIOTT         2/16/21       Ferrous Gluconate (Ferrous Gluconate*) 324 Mg Tablet      325 MG PO QDAY,  #30 TAB 3 Refills         Prov: EARLHOLLIE         1/21/21       Baclofen (BACLOFEN) 10 Mg Tablet      10 MG PO TID PRN for HICCUPS, #90 TAB 1 Refill         Prov: EARLHOLLIE         12/17/20       hydrOXYzine HCL (hydrOXYzine HCL) 25 Mg Tablet      25 MG PO HS PRN for INSOMNIA, #30 TAB 2 Refills         Prov: HOLLIE ELLIOTT         12/7/20       Ondansetron Odt (ONDANSETRON ODT) 8 Mg Tab.rapdis      8 MG PO Q8 for nv, #30 TAB.RAPDIS 3 Refills         Prov: EARLHOLLIE         12/7/20       OLANZapine (OLANZapine) 10 Mg Tablet      10 MG PO HS for 30 Days, #30 TAB 8 Refills         Prov: HOLLIE ELLIOTT         12/7/20      Medications Reviewed:  No Changes made to meds            IMPRESSION/PLAN      Diagnosis      Gallbladder cancer - C23            Notes      New Diagnostics      * CT Abd/Pelvis/Chest W/Contrast, 3 Week         Dx: Gallbladder cancer - C23      * CBC With Auto Diff, 3 Week         Dx: Gallbladder cancer - C23      * CMP Comp Metabolic Panel, 3 Week         Dx: Gallbladder cancer - C23            Plan      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  Quickly     progressed before surgical resection.  He underwent 2 cycles of cisplatin and     gemcitabine with significant progression. He enrolled in Hospice just after C2     of Keytruda but then clinically improved.  He is here today for C5 of Keytruda     and continues to clinically improve. Will get CT CAP for restaging prior to the     next cycle.             Left Hip Pain: likely musculoskeletal in nature. Will add Left hip CT to the     other staging scans.  Patient is agreeable to home PT.             Iron Deficiency Anemia: finished injectafer on 5/13/21.  Will repeat iron     studies in August.            Patient Education      Patient Education Provided:  Yes            Electronically signed by HOLLIE ELLIOTT  05/31/2021 22:42       Disclaimer: Converted document may not contain table formatting or lab diagrams.  Please see "[employer] System for the authenticated document.

## 2021-06-10 ENCOUNTER — TRANSCRIBE ORDERS (OUTPATIENT)
Dept: LAB | Facility: HOSPITAL | Age: 66
End: 2021-06-10

## 2021-06-10 ENCOUNTER — HOSPITAL ENCOUNTER (OUTPATIENT)
Dept: CT IMAGING | Facility: HOSPITAL | Age: 66
Discharge: HOME | End: 2021-06-10

## 2021-06-10 ENCOUNTER — LAB (OUTPATIENT)
Dept: LAB | Facility: HOSPITAL | Age: 66
End: 2021-06-10

## 2021-06-10 DIAGNOSIS — C23 MALIGNANT NEOPLASM OF GALLBLADDER (HCC): Primary | ICD-10-CM

## 2021-06-10 DIAGNOSIS — C23 MALIGNANT NEOPLASM OF GALLBLADDER (HCC): ICD-10-CM

## 2021-06-10 LAB
ALBUMIN SERPL-MCNC: 3.4 G/DL (ref 3.5–5.2)
ALBUMIN/GLOB SERPL: 0.8 G/DL
ALP SERPL-CCNC: 220 U/L (ref 39–117)
ALT SERPL W P-5'-P-CCNC: 35 U/L (ref 1–41)
ANION GAP SERPL CALCULATED.3IONS-SCNC: 12.1 MMOL/L (ref 5–15)
AST SERPL-CCNC: 34 U/L (ref 1–40)
BASOPHILS # BLD AUTO: 0.1 10*3/MM3 (ref 0–0.2)
BASOPHILS NFR BLD AUTO: 0.6 % (ref 0–1.5)
BILIRUB SERPL-MCNC: 0.4 MG/DL (ref 0–1.2)
BUN SERPL-MCNC: 13 MG/DL (ref 8–23)
BUN/CREAT SERPL: 19.7 (ref 7–25)
CALCIUM SPEC-SCNC: 9.3 MG/DL (ref 8.6–10.5)
CHLORIDE SERPL-SCNC: 95 MMOL/L (ref 98–107)
CO2 SERPL-SCNC: 25.9 MMOL/L (ref 22–29)
CREAT SERPL-MCNC: 0.66 MG/DL (ref 0.76–1.27)
DEPRECATED RDW RBC AUTO: 51.2 FL (ref 37–54)
EOSINOPHIL # BLD AUTO: 0.75 10*3/MM3 (ref 0–0.4)
EOSINOPHIL NFR BLD AUTO: 4.3 % (ref 0.3–6.2)
ERYTHROCYTE [DISTWIDTH] IN BLOOD BY AUTOMATED COUNT: 17.4 % (ref 12.3–15.4)
GFR SERPL CREATININE-BSD FRML MDRD: 122 ML/MIN/1.73
GLOBULIN UR ELPH-MCNC: 4.2 GM/DL
GLUCOSE SERPL-MCNC: 179 MG/DL (ref 65–99)
HCT VFR BLD AUTO: 34.9 % (ref 37.5–51)
HGB BLD-MCNC: 10.9 G/DL (ref 13–17.7)
IMM GRANULOCYTES # BLD AUTO: 0.13 10*3/MM3 (ref 0–0.05)
IMM GRANULOCYTES NFR BLD AUTO: 0.7 % (ref 0–0.5)
LYMPHOCYTES # BLD AUTO: 1.29 10*3/MM3 (ref 0.7–3.1)
LYMPHOCYTES NFR BLD AUTO: 7.4 % (ref 19.6–45.3)
MCH RBC QN AUTO: 25.6 PG (ref 26.6–33)
MCHC RBC AUTO-ENTMCNC: 31.2 G/DL (ref 31.5–35.7)
MCV RBC AUTO: 82.1 FL (ref 79–97)
MONOCYTES # BLD AUTO: 1.21 10*3/MM3 (ref 0.1–0.9)
MONOCYTES NFR BLD AUTO: 6.9 % (ref 5–12)
NEUTROPHILS NFR BLD AUTO: 14.01 10*3/MM3 (ref 1.7–7)
NEUTROPHILS NFR BLD AUTO: 80.1 % (ref 42.7–76)
NRBC BLD AUTO-RTO: 0 /100 WBC (ref 0–0.2)
PLATELET # BLD AUTO: 633 10*3/MM3 (ref 140–450)
PMV BLD AUTO: 9.9 FL (ref 6–12)
POTASSIUM SERPL-SCNC: 3.8 MMOL/L (ref 3.5–5.2)
PROT SERPL-MCNC: 7.6 G/DL (ref 6–8.5)
RBC # BLD AUTO: 4.25 10*6/MM3 (ref 4.14–5.8)
SODIUM SERPL-SCNC: 133 MMOL/L (ref 136–145)
WBC # BLD AUTO: 17.49 10*3/MM3 (ref 3.4–10.8)

## 2021-06-10 PROCEDURE — 0 IOPAMIDOL PER 1 ML: Performed by: INTERNAL MEDICINE

## 2021-06-10 PROCEDURE — 80053 COMPREHEN METABOLIC PANEL: CPT

## 2021-06-10 PROCEDURE — 85025 COMPLETE CBC W/AUTO DIFF WBC: CPT

## 2021-06-10 PROCEDURE — 71260 CT THORAX DX C+: CPT

## 2021-06-10 PROCEDURE — 36415 COLL VENOUS BLD VENIPUNCTURE: CPT

## 2021-06-10 PROCEDURE — 74177 CT ABD & PELVIS W/CONTRAST: CPT

## 2021-06-10 RX ADMIN — IOPAMIDOL 100 ML: 755 INJECTION, SOLUTION INTRAVENOUS at 09:04

## 2021-06-15 PROBLEM — K80.20 GALLSTONES: Status: ACTIVE | Noted: 2021-06-15

## 2021-06-15 PROBLEM — C23 GALLBLADDER CANCER, CARCINOMA (HCC): Status: ACTIVE | Noted: 2021-06-15

## 2021-06-15 RX ORDER — OLANZAPINE 10 MG/1
10 TABLET ORAL
COMMUNITY
Start: 2021-05-12

## 2021-06-15 RX ORDER — HYDROXYZINE HYDROCHLORIDE 25 MG/1
25 TABLET, FILM COATED ORAL NIGHTLY PRN
COMMUNITY
Start: 2021-03-13

## 2021-06-15 RX ORDER — METRONIDAZOLE 500 MG/1
500 TABLET ORAL 3 TIMES DAILY
COMMUNITY
Start: 2021-03-08 | End: 2021-06-17 | Stop reason: SDUPTHER

## 2021-06-15 RX ORDER — CIPROFLOXACIN 500 MG/1
TABLET, FILM COATED ORAL
COMMUNITY
Start: 2021-03-08 | End: 2021-06-15 | Stop reason: SDUPTHER

## 2021-06-15 RX ORDER — DRONABINOL 10 MG/1
10 CAPSULE ORAL 2 TIMES DAILY
COMMUNITY
Start: 2021-04-17

## 2021-06-15 RX ORDER — DOXYCYCLINE HYCLATE 50 MG/1
1 CAPSULE, GELATIN COATED ORAL DAILY
COMMUNITY
Start: 2021-03-19 | End: 2021-06-15 | Stop reason: SDUPTHER

## 2021-06-15 RX ORDER — HYDROCODONE BITARTRATE AND ACETAMINOPHEN 7.5; 325 MG/1; MG/1
TABLET ORAL
COMMUNITY

## 2021-06-15 RX ORDER — ONDANSETRON 8 MG/1
TABLET, ORALLY DISINTEGRATING ORAL
COMMUNITY
Start: 2021-03-13

## 2021-06-15 RX ORDER — DULOXETIN HYDROCHLORIDE 30 MG/1
30 CAPSULE, DELAYED RELEASE ORAL DAILY
COMMUNITY
Start: 2021-05-11

## 2021-06-17 ENCOUNTER — OFFICE VISIT (OUTPATIENT)
Dept: ONCOLOGY | Facility: HOSPITAL | Age: 66
End: 2021-06-17

## 2021-06-17 ENCOUNTER — HOSPITAL ENCOUNTER (OUTPATIENT)
Dept: ONCOLOGY | Facility: HOSPITAL | Age: 66
Setting detail: INFUSION SERIES
Discharge: HOME | End: 2021-06-17

## 2021-06-17 VITALS
HEART RATE: 105 BPM | TEMPERATURE: 97.3 F | OXYGEN SATURATION: 100 % | HEIGHT: 67 IN | DIASTOLIC BLOOD PRESSURE: 68 MMHG | BODY MASS INDEX: 23.08 KG/M2 | RESPIRATION RATE: 18 BRPM | SYSTOLIC BLOOD PRESSURE: 107 MMHG | WEIGHT: 147.05 LBS

## 2021-06-17 VITALS
HEART RATE: 105 BPM | DIASTOLIC BLOOD PRESSURE: 68 MMHG | TEMPERATURE: 97.3 F | RESPIRATION RATE: 18 BRPM | WEIGHT: 147.05 LBS | SYSTOLIC BLOOD PRESSURE: 107 MMHG | BODY MASS INDEX: 23.08 KG/M2 | HEIGHT: 67 IN | OXYGEN SATURATION: 100 %

## 2021-06-17 DIAGNOSIS — C23: Primary | ICD-10-CM

## 2021-06-17 DIAGNOSIS — E86.0 DEHYDRATION: ICD-10-CM

## 2021-06-17 DIAGNOSIS — C23 GALLBLADDER CANCER, CARCINOMA (HCC): ICD-10-CM

## 2021-06-17 DIAGNOSIS — D64.9 ANEMIA, UNSPECIFIED TYPE: ICD-10-CM

## 2021-06-17 DIAGNOSIS — K65.9 ABDOMINAL INFECTION (HCC): ICD-10-CM

## 2021-06-17 LAB
ALBUMIN SERPL-MCNC: 3.25 G/DL (ref 3.5–5.2)
ALBUMIN/GLOB SERPL: 0.8 G/DL
ALP SERPL-CCNC: 243 U/L (ref 39–117)
ALT SERPL W P-5'-P-CCNC: 29 U/L (ref 1–41)
ANION GAP SERPL CALCULATED.3IONS-SCNC: 10.5 MMOL/L (ref 5–15)
AST SERPL-CCNC: 25 U/L (ref 1–40)
BASOPHILS # BLD AUTO: 0.07 10*3/MM3 (ref 0–0.2)
BASOPHILS NFR BLD AUTO: 0.3 % (ref 0–1.5)
BILIRUB SERPL-MCNC: 0.4 MG/DL (ref 0–1.2)
BUN SERPL-MCNC: 11 MG/DL (ref 8–23)
BUN/CREAT SERPL: 20.4 (ref 7–25)
CALCIUM SPEC-SCNC: 9.3 MG/DL (ref 8.6–10.5)
CHLORIDE SERPL-SCNC: 93 MMOL/L (ref 98–107)
CO2 SERPL-SCNC: 26.5 MMOL/L (ref 22–29)
CREAT SERPL-MCNC: 0.54 MG/DL (ref 0.76–1.27)
DEPRECATED RDW RBC AUTO: 51.1 FL (ref 37–54)
EOSINOPHIL # BLD AUTO: 0.85 10*3/MM3 (ref 0–0.4)
EOSINOPHIL NFR BLD AUTO: 3.9 % (ref 0.3–6.2)
ERYTHROCYTE [DISTWIDTH] IN BLOOD BY AUTOMATED COUNT: 17.5 % (ref 12.3–15.4)
FERRITIN SERPL-MCNC: 1233 NG/ML (ref 30–400)
GFR SERPL CREATININE-BSD FRML MDRD: >150 ML/MIN/1.73
GLOBULIN UR ELPH-MCNC: 4.2 GM/DL
GLUCOSE SERPL-MCNC: 202 MG/DL (ref 65–99)
HCT VFR BLD AUTO: 32.6 % (ref 37.5–51)
HGB BLD-MCNC: 10.6 G/DL (ref 13–17.7)
IMM GRANULOCYTES # BLD AUTO: 0.12 10*3/MM3 (ref 0–0.05)
IMM GRANULOCYTES NFR BLD AUTO: 0.6 % (ref 0–0.5)
IRON 24H UR-MRATE: 24 MCG/DL (ref 59–158)
IRON SATN MFR SERPL: 12 % (ref 20–50)
LYMPHOCYTES # BLD AUTO: 1.55 10*3/MM3 (ref 0.7–3.1)
LYMPHOCYTES NFR BLD AUTO: 7.1 % (ref 19.6–45.3)
MCH RBC QN AUTO: 25.8 PG (ref 26.6–33)
MCHC RBC AUTO-ENTMCNC: 32.5 G/DL (ref 31.5–35.7)
MCV RBC AUTO: 79.3 FL (ref 79–97)
MONOCYTES # BLD AUTO: 1.83 10*3/MM3 (ref 0.1–0.9)
MONOCYTES NFR BLD AUTO: 8.4 % (ref 5–12)
NEUTROPHILS NFR BLD AUTO: 17.38 10*3/MM3 (ref 1.7–7)
NEUTROPHILS NFR BLD AUTO: 79.7 % (ref 42.7–76)
PLATELET # BLD AUTO: 534 10*3/MM3 (ref 140–450)
PMV BLD AUTO: 8.4 FL (ref 6–12)
POTASSIUM SERPL-SCNC: 3.4 MMOL/L (ref 3.5–5.2)
PROT SERPL-MCNC: 7.4 G/DL (ref 6–8.5)
RBC # BLD AUTO: 4.11 10*6/MM3 (ref 4.14–5.8)
SODIUM SERPL-SCNC: 130 MMOL/L (ref 136–145)
TIBC SERPL-MCNC: 197 MCG/DL (ref 298–536)
TRANSFERRIN SERPL-MCNC: 132 MG/DL (ref 200–360)
WBC # BLD AUTO: 21.8 10*3/MM3 (ref 3.4–10.8)

## 2021-06-17 PROCEDURE — 36415 COLL VENOUS BLD VENIPUNCTURE: CPT | Performed by: INTERNAL MEDICINE

## 2021-06-17 PROCEDURE — 83540 ASSAY OF IRON: CPT | Performed by: INTERNAL MEDICINE

## 2021-06-17 PROCEDURE — 82728 ASSAY OF FERRITIN: CPT | Performed by: INTERNAL MEDICINE

## 2021-06-17 PROCEDURE — 96360 HYDRATION IV INFUSION INIT: CPT

## 2021-06-17 PROCEDURE — 80053 COMPREHEN METABOLIC PANEL: CPT | Performed by: INTERNAL MEDICINE

## 2021-06-17 PROCEDURE — 85025 COMPLETE CBC W/AUTO DIFF WBC: CPT | Performed by: INTERNAL MEDICINE

## 2021-06-17 PROCEDURE — 99214 OFFICE O/P EST MOD 30 MIN: CPT | Performed by: INTERNAL MEDICINE

## 2021-06-17 PROCEDURE — 84466 ASSAY OF TRANSFERRIN: CPT | Performed by: INTERNAL MEDICINE

## 2021-06-17 PROCEDURE — 25010000002 HEPARIN LOCK FLUSH PER 10 UNITS: Performed by: INTERNAL MEDICINE

## 2021-06-17 RX ORDER — SODIUM CHLORIDE 0.9 % (FLUSH) 0.9 %
20 SYRINGE (ML) INJECTION AS NEEDED
Status: DISCONTINUED | OUTPATIENT
Start: 2021-06-17 | End: 2021-06-18 | Stop reason: HOSPADM

## 2021-06-17 RX ORDER — HEPARIN SODIUM (PORCINE) LOCK FLUSH IV SOLN 100 UNIT/ML 100 UNIT/ML
300 SOLUTION INTRAVENOUS ONCE
Status: CANCELLED | OUTPATIENT
Start: 2021-06-17

## 2021-06-17 RX ORDER — SODIUM CHLORIDE 0.9 % (FLUSH) 0.9 %
10 SYRINGE (ML) INJECTION AS NEEDED
Status: CANCELLED | OUTPATIENT
Start: 2021-06-17

## 2021-06-17 RX ORDER — HEPARIN SODIUM (PORCINE) LOCK FLUSH IV SOLN 100 UNIT/ML 100 UNIT/ML
500 SOLUTION INTRAVENOUS AS NEEDED
Status: CANCELLED | OUTPATIENT
Start: 2021-06-17

## 2021-06-17 RX ORDER — METRONIDAZOLE 500 MG/1
500 TABLET ORAL 3 TIMES DAILY
Qty: 42 TABLET | Refills: 0 | Status: SHIPPED | OUTPATIENT
Start: 2021-06-17 | End: 2021-07-01

## 2021-06-17 RX ORDER — HEPARIN SODIUM (PORCINE) LOCK FLUSH IV SOLN 100 UNIT/ML 100 UNIT/ML
500 SOLUTION INTRAVENOUS AS NEEDED
Status: DISCONTINUED | OUTPATIENT
Start: 2021-06-17 | End: 2021-06-18 | Stop reason: HOSPADM

## 2021-06-17 RX ORDER — SODIUM CHLORIDE 0.9 % (FLUSH) 0.9 %
20 SYRINGE (ML) INJECTION AS NEEDED
Status: CANCELLED | OUTPATIENT
Start: 2021-06-17

## 2021-06-17 RX ADMIN — SODIUM CHLORIDE, PRESERVATIVE FREE 20 ML: 5 INJECTION INTRAVENOUS at 12:48

## 2021-06-17 RX ADMIN — HEPARIN SODIUM (PORCINE) LOCK FLUSH IV SOLN 100 UNIT/ML 500 UNITS: 100 SOLUTION at 12:49

## 2021-06-17 RX ADMIN — SODIUM CHLORIDE 1000 ML: 9 INJECTION, SOLUTION INTRAVENOUS at 11:47

## 2021-06-17 NOTE — PROGRESS NOTES
"Outpatient Nutrition Oncology Assessment    Patient Name: Elver Sanders  YOB: 1955  MRN: [id number]  Assessment Date: 6/17/2021    CLINICAL NUTRITION ASSESSMENT      Type of Cancer Treatment    Keytruda     CLINICAL NUTRITION ASSESSMENT      Reason for Assessment  Follow-up protocol     H&P:    Past Medical History:   Diagnosis Date   • Gallbladder cancer (CMS/HCC)    • Skin cancer         Current Problems:   Patient Active Problem List   Diagnosis Code   • Gallbladder malignant neoplasm (CMS/HCC) C23   • Gallbladder cancer, carcinoma (CMS/HCC) C23   • Gallstones K80.20        Anthropometrics     Row Name 06/17/21 1233 06/17/21 0835       Anthropometrics    Height  --  169.5 cm (66.73\")    Weight  --  66.7 kg (147 lb 0.8 oz)       Ideal Body Weight (IBW)    Ideal Body Weight (IBW) (kg)  --  67.35    % Ideal Body Weight  --  99.04       Usual Body Weight (UBW)    Weight Loss  unintentional  --    Weight Loss Time Frame  3 weeks  --       Body Mass Index (BMI)    BMI (kg/m2)  --  23.26          BMI kg/m2   Body mass index is 23.22 kg/m².    Weight Hx  Wt Readings from Last 30 Encounters:   06/17/21 0958 66.7 kg (147 lb 0.8 oz)   06/17/21 0835 66.7 kg (147 lb 0.8 oz)   05/27/21 0939 70.8 kg (156 lb 1.4 oz)   05/19/21 1503 68.7 kg (151 lb 7.3 oz)   05/06/21 0906 69 kg (152 lb 1.9 oz)   04/16/21 0953 64 kg (141 lb 1.5 oz)   03/23/21 0921 71.8 kg (158 lb 4.6 oz)   03/02/21 0859 74.8 kg (164 lb 14.5 oz)   01/21/21 0827 81.1 kg (178 lb 12.7 oz)   12/31/20 0848 81.8 kg (180 lb 5.4 oz)   12/07/20 1311 83.1 kg (183 lb 3.2 oz)   11/24/20 0000 83.5 kg (184 lb 2 oz)   09/22/20 1459 87.5 kg (192 lb 14.4 oz)   09/15/20 0000 85 kg (187 lb 8 oz)   09/03/20 0000 88 kg (194 lb)   09/01/20 0000 88.1 kg (194 lb 2 oz)        Estimated/Assessed Needs     Row Name 06/17/21 1234 06/17/21 0835       Calculation Measurements    Weight Used For Calculations  66.7 kg (147 lb 0.8 oz)  --    Height  --  169.5 cm (66.73\")       " Estimated/Assessed Needs    Additional Documentation  KCAL/KG (Group);Protein Requirements (Group);Fluid Requirements (Group)  --       KCAL/KG    KCAL/KG  30 Kcal/Kg (kcal);35 Kcal/Kg (kcal)  --    30 Kcal/Kg (kcal)  2001  --    35 Kcal/Kg (kcal)  2334.5  --       Protein Requirements    Weight Used For Protein Calculations  66.7 kg (147 lb 0.8 oz)  --    Est Protein Requirement Amount (gms/kg)  1.4 gm protein  --    Estimated Protein Requirements (gms/day)  93.38  --       Fluid Requirements    Fluid Requirements (mL/day)  2200  --    RDA Method (mL)  2200  --           Labs/Medications        Pertinent Labs Reviewed.   Results from last 7 days   Lab Units 06/17/21  0845   SODIUM mmol/L 130*   POTASSIUM mmol/L 3.4*   CHLORIDE mmol/L 93*   CO2 mmol/L 26.5   BUN mg/dL 11   CREATININE mg/dL 0.54*   CALCIUM mg/dL 9.3   BILIRUBIN mg/dL 0.4   ALK PHOS U/L 243*   ALT (SGPT) U/L 29   AST (SGOT) U/L 25   GLUCOSE mg/dL 202*     Results from last 7 days   Lab Units 06/17/21  0845   HEMOGLOBIN g/dL 10.6*   HEMATOCRIT % 32.6*     Coronavirus (COVID-19)   Date Value Ref Range Status   08/29/2020 NOT DETECTED NA Final     Comment:     The SARS-CoV-2 assay is a real-time, RT-PCR test intended  for the qualitative detection of nucleic acid from the  SARS-CoV-2 in respiratory specimens from individuals,  testing performed at [facility].       No results found for: HGBA1C      Pertinent Medications DULoxetine, HYDROcodone-acetaminophen, OLANZapine, capecitabine, dronabinol, hydrOXYzine, metroNIDAZOLE, and ondansetron ODT     Physical Findings            Current Nutrition Orders & Evaluation of Intake       Oral Nutrition     Current PO Diet    Supplement      Enteral Nutrition     Current Formula    Schedule      Parenteral Nutrition     Current Prescription    Schedule      Nutrition Diagnosis        Nutrition Dx Problem 1 Inadequate energy Intake related to decreased ability to consume sufficient energy as evidenced by  family report, unintentional weight loss of 5.8% X 3 weeks..       Nutrition Intervention       RD Action      Monitor/Evaluation       Monitor Per oncology nutrition protocol.     Comments:  Spoke with pt's wife via phone. Pt with decreased appetite and diarrhea X 2 weeks resulting in unintentional weight loss. Pt's wife reports pt with an infection and is receiving antibiotics to help. Pt consumes 2-3 small meals per day with Premiere Protein shakes BID - some fortified with peanut butter and whole milk. Pt prefers chocolate flavor of protein shakes. Discussed with pt's wife MNT for poor appetite, including small frequent meals consumed every 2-3 hours, fortifying Premiere protein shakes with high kcal items when consumed alone, avoiding GI irritants such as chocolate and sugar alcohols, as well as including oral rehydration solutions during times of frequent diarrhea. Pt takes Imodium to help GI distress. Pt also reports experiencing dry mouth. Provided wife with homemade recipe for mouth lubricant and will mail handouts for recipe as well as sources of sugar alcohols.   Pt also reports consuming frequent sprite throughout the day and has been advised by MD to avoid these as they may interfere with appetite signals. RD in agreement. Encouraged water intake and Gatorade like beverages along with appropriate use of oral rehydration solutions. Will continue to f/u accordingly.     Due to the current Covid-19 pandemic and circumstances of having to work remotely, nutrition assessment was conducted using review of the medical record and/or telephone interview of the patient.    Electronically signed by:  Leeann Cuellar RD  06/17/21 12:36 EDT

## 2021-06-17 NOTE — PROGRESS NOTES
Chief Complaint  Chemotherapy (GALLBLADDER CA) and Follow-up    Referring Provider: Maggie Coon MD PhD  PCP: Daniel Gonzalez MD    Subjective          Oncology/Hematology History Overview Note   Gallbladder cancer:  Mr. Sanders comes in today with his wife to discuss the plan of care regarding his new diagnosis of gallbladder cancer.  He was referred to me by Dr. Rolando Masters, his general surgeon.    Patient states he is always been in good health and takes no medications.  About 1 month ago he started to have a little bit of epigastric abdominal pain.  Then the pain got significantly worse and he went to the emergency room.  There and ultrasound revealed thickening of the gallbladder.  He underwent a simple cholecystectomy on 8/29/2020.  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  The tumor perforated through the serosa.  Reports indicate the gallbladder ruptured into the surgical cavity during the procedure.  Dr. Thayer attempted to wash the peritoneal cavity.    11/18/2020: Laparoscopy with biopsies revealed peritoneal carcinomatosis.  Biopsies were positive for metastatic undifferentiated carcinoma, morphologically consistent with known gallbladder primary.  Possible liver metastases that were seen on imaging at  were found to be on the surface of the liver instead.  He was also found to have a mass around his colon.  The recommendation was for colonoscopy which the patient had recently.  The colonoscopy was negative for evidence of colon cancer.    C1D1 of Craig/Cis. C2D1 on 1/21/21 2/4/21 (Comparison image from 11/4/20 at  not available at the time of this read):   CT chest:  1. No evidence of metastatic disease within the chest.    2. Elevated right hemidiaphragm with bandlike atelectasis within the right middle lobe and right lower lobe.     CT abdomen and pelvis:  1. Abnormal low-density perihepatic lesions which may  represent capsular metastasis or mucinous pseudomyxoma peritonei.  2. Infiltrative soft tissue within the gallbladder fossa with suspected fistulization to the hepatic flexure.  3. Abnormal low-density mass involving the pancreatic head and body without ductal obstruction or vascular occlusion.  4. Abnormal low-density the lesions along the posterolateral aspect of the cecum and ascending colon.     [facility] Molecular Report: PDL1 3+ (80%), TMB low (6mut/Mb), MSI-S     C1 Keytruda on 3/2/21.  He enrolled in Hospice just after C2 of Keytruda but then clinically improved and restarted Keytruda on 4/16/21.     Gallbladder malignant neoplasm (CMS/HCC) (Resolved)   3/2/2021 -  Chemotherapy    OP Pembrolizumab 200 mg     5/19/2021 Initial Diagnosis    Gallbladder malignant neoplasm (CMS/HCC)     6/17/2021 -  Chemotherapy    OP CENTRAL VENOUS ACCESS DEVICE ACCESS, CARE, AND MAINTENANCE (CVAD)     Gallbladder cancer, carcinoma (CMS/HCC)   3/2/2021 -  Chemotherapy    OP Pembrolizumab 200 mg     6/15/2021 Initial Diagnosis    Gallbladder cancer, carcinoma (CMS/HCC)     6/17/2021 -  Chemotherapy    OP CENTRAL VENOUS ACCESS DEVICE ACCESS, CARE, AND MAINTENANCE (CVAD)     6/17/2021 -  Chemotherapy    OP SUPPORTIVE HYDRATION + ANTIEMETICS       Elver Sanders presents to [facility] for  History of Present Illness patient comes in today for his next cycle of immunotherapy.  He got the results of his CT scan on 6/10/2021 which shows significant improvement of disease but possibly one area of increased disease around the primary tumor lateral to the right lobe of the liver.  Patient's wife is with him again today.  The patient is talking but does not appear as energetic as he has on the previous 2 visits.  She tells me that he has gone back to the way he was feeling couple of months ago.  He does not have much appetite.  He drinks about 5 bottles of Sprite a day but does not drink much water.   He has little interest in doing activities and is losing weight again.  He has not had any fevers and denies any significant abdominal pain.  He is having significant diarrhea which started about a week ago.  He has about 3 loose bowel movements per day and is taking Imodium about 2 times a day.  His wife believes his diarrhea was worse in the first few days when he is not taking any Imodium.        CT Chest With Contrast Diagnostic    Result Date: 6/10/2021  PROCEDURE: CT CHEST W CONTRAST DIAGNOSTIC  COMPARISON:  [facility], CT, CT CHEST ABD PEL W CONTRAST, 2/04/2021, 12:23. INDICATIONS: GALLBLADDER CANCER RESTAGING  TECHNIQUE: After obtaining the patient's consent, CT images were obtained with non-ionic intravenous contrast material.   PROTOCOL:   Standard imaging protocol performed    RADIATION:   DLP: 532mGy*cm   Automated exposure control was utilized to minimize radiation dose. CONTRAST: 100cc Isovue 370 I.V.  FINDINGS:  The central airways are patent.  Linear scarring within the right middle lobe.  No suspicious pulmonary nodules are identified.  Port is in satisfactory position.  No mediastinal or axillary adenopathy.  The thyroid is normal.  Aorta and pulmonary artery are normal in caliber.  Esophagus is normal.  No aggressive appearing lytic or sclerotic bone lesions are identified.  CONCLUSION:  1. No evidence of metastatic disease within the chest.     RONNIE OGDEN MD       Electronically Signed and Approved By: RONNIE OGDEN MD on 6/10/2021 at 9:28             CT Abdomen Pelvis With Contrast    Result Date: 6/10/2021  PROCEDURE: CT ABDOMEN PELVIS W CONTRAST  COMPARISON: [facility], CT, CT CHEST ABD PEL W CONTRAST, 2/04/2021, 12:23.  INDICATIONS: GALLBLADDER CA  TECHNIQUE: After obtaining the patient's consent, CT images were created with non-ionic intravenous contrast material.   PROTOCOL:   Standard imaging protocol performed    RADIATION:   DLP: 532mGy*cm   Automated  exposure control was utilized to minimize radiation dose. CONTRAST: 100cc Isovue 370 I.V.  FINDINGS:  There is minimal right basilar atelectasis or scarring.  Again seen within the region of the gallbladder fossa are cystic areas of low attenuation, contiguous with the hepatic flexure of the colon.  Gallbladder is inseparable from the cystic appearing masses and the colon.  Overall there has been an increase in enhancing soft tissue and cystic masslike areas within the diogenes pedis when compared to the prior exam.  Again seen are low-density subhepatic fluid collections or masses along the right lobe of the liver.  These also appear slightly decreased in size.  Largest collection now measures 8.6 x 4.6 cm, anterior to the left lobe of the liver.  Previously this measured 10.5 x 6.5 cm. There is a new soft tissue density mass lateral to the right lobe of the liver along the peritoneum measuring 3.0 x 1.8 cm in size.  Cystic appearing celiac adenopathy on the prior exam size now measuring 2.1 x 1.5 cm, previously 4.2 x 3.3 cm.  Spleen appears within normal limits.  Bilateral adrenal glands.  Within the right lobe of the liver there is a discrete low-density mass measuring 2.0 x 1.8 cm in size, previously this measured 1.8 x 1.6 cm.  Bilateral adrenal glands are within normal limits.  Kidneys also appear within normal limits bilaterally.  Cystic peritoneal metastases along the right pericolic gutter have decreased in size.  There is some bowel wall thickening of the ascending colon, similar to the prior exam.  The appendix is normal.  Pelvis:  Urinary bladder is within normal limits.  There are multiple sigmoid diverticula but no evidence of diverticulitis.  There is no pelvic or inguinal adenopathy.  There is no free intraperitoneal fluid.  There are degenerative changes of the spine and hips.  No lytic or sclerotic bony lesion identified.  CONCLUSION:  1. Mixed response to therapy with at apparent increase in size of  the primary mass within the region the diogenes hepatic.  There has been decrease in size of cystic celiac adenopathy and peritoneal metastatic disease within the right pericolic gutter.  There is a new soft tissue density metastasis along the peritoneum lateral to the right lobe of the liver.  An intrahepatic metastatic lesion also appears increased in size from prior study.     RONNIE FARLEY MD       Electronically Signed and Approved By: RONNIE FARLEY MD on 6/10/2021 at 9:40                      Review of Systems   Constitutional: Positive for appetite change, fatigue and unexpected weight loss. Negative for diaphoresis, fever and unexpected weight gain.   HENT: Negative for hearing loss, mouth sores, sore throat, swollen glands, trouble swallowing and voice change.    Eyes: Negative for blurred vision.   Respiratory: Negative for cough, shortness of breath and wheezing.    Cardiovascular: Negative for chest pain and palpitations.   Gastrointestinal: Positive for diarrhea. Negative for abdominal pain, blood in stool, constipation, nausea and vomiting.   Endocrine: Negative for cold intolerance and heat intolerance.   Genitourinary: Negative for difficulty urinating, dysuria, frequency, hematuria and urinary incontinence.   Musculoskeletal: Negative for arthralgias, back pain and myalgias.   Skin: Negative for rash, skin lesions and bruise.   Neurological: Negative for dizziness, seizures, weakness, numbness and headache.   Hematological: Does not bruise/bleed easily.   Psychiatric/Behavioral: Negative for depressed mood. The patient is not nervous/anxious.        Past Medical History:   Diagnosis Date   • Gallbladder cancer (CMS/HCC)    • Skin cancer      Past Surgical History:   Procedure Laterality Date   • CHOLECYSTECTOMY     • SKIN CANCER EXCISION       Social History     Socioeconomic History   • Marital status:      Spouse name: Not on file   • Number of children: 2   • Years of education: Not on file  "  • Highest education level: Not on file   Tobacco Use   • Smoking status: Former Smoker   • Smokeless tobacco: Current User     Types: Chew   • Tobacco comment: DIPS TOBACCO DAILY   Substance and Sexual Activity   • Alcohol use: Yes     Comment: 0-2 DRINKS PER DAY   • Drug use: Never     Family History   Problem Relation Age of Onset   • Breast cancer Mother        Objective   Physical Exam   General: Alert, cooperative, no acute distress, but very thin/cachectic  Eyes: Anicteric sclera, PERRLA  Respiratory: CTAB, normal respiratory effort  Abdomen: Normal active bowel sounds, no tenderness to palpation of any quadrant of the abdomen, no distention  Cardiovascular: RRR, no murmur, no lower extremity edema  Skin: Normal tone, no rash, no lesions  Psychiatric: Flat affect except for one moment when he appeared to be holding back tears, intact judgment  Neurologic: No focal sensory or motor deficits, no weakness, numbness, dizziness  Musculoskeletal: Reduced muscle strength and tone, able ambulate on his own  Extremities: No clubbing, cyanosis, or deformities      Vitals:    06/17/21 0958   BP: 107/68   Pulse: 105   Resp: 18   Temp: 97.3 °F (36.3 °C)   TempSrc: Temporal   SpO2: 100%   Weight: 66.7 kg (147 lb 0.8 oz)   Height: 169.5 cm (66.73\")   PainSc: 0-No pain     ECOG score: 1         PHQ-9 Total Score: 0                  Result Review :   The following data was reviewed by: Maggie Coon MD PhD on 06/17/2021:  Lab Results   Component Value Date    HGB 10.6 (L) 06/17/2021    HCT 32.6 (L) 06/17/2021    MCV 79.3 06/17/2021     (H) 06/17/2021    WBC 21.80 (C) 06/17/2021    NEUTROABS 17.38 (H) 06/17/2021    LYMPHSABS 1.55 06/17/2021    MONOSABS 1.83 (H) 06/17/2021    EOSABS 0.85 (H) 06/17/2021    BASOSABS 0.07 06/17/2021     Lab Results   Component Value Date    GLUCOSE 202 (H) 06/17/2021    BUN 11 06/17/2021    CREATININE 0.54 (L) 06/17/2021     (L) 06/17/2021    K 3.4 (L) 06/17/2021    CL 93 (L) " 06/17/2021    CO2 26.5 06/17/2021    CALCIUM 9.3 06/17/2021    PROTEINTOT 7.4 06/17/2021    ALBUMIN 3.25 (L) 06/17/2021    BILITOT 0.4 06/17/2021    ALKPHOS 243 (H) 06/17/2021    AST 25 06/17/2021    ALT 29 06/17/2021     I reviewed the patient's labs today which are concerning for white blood cell count of 21,000 with an ANC over 17,000.  His hemoglobin is stable at 10.6 and platelet count increased at 534.  Is initially concerned about iron deficiency anemia so I added iron studies with ferritin onto the patient's labs.  His transferrin saturation remains around 12% and his ferritin is over 1200.  This indicates his anemia is likely secondary to chronic inflammation.  I am very concerned that his ferritin and his white blood cell count are both pointing toward an intra-abdominal infection or rapid progression of disease.  I have started the patient on antibiotics including Augmentin and Flagyl.  He will return to clinic in 1 week with repeat labs.  I will hold his treatment today.  If his white blood cell count and clinical picture are improved and I will restart therapy.  I am also referring him to radiation oncology for evaluation and possible radiation to the site of known progression of disease.     Assessment and Plan    Diagnoses and all orders for this visit:    1. Gallbladder malignant neoplasm (CMS/HCC) (Primary)  -     Ambulatory Referral to Radiation Oncology  -     CBC and Differential; Future  -     Comprehensive metabolic panel; Future    2. Anemia, unspecified type  -     Iron Profile  -     Ferritin    3. Abdominal infection (CMS/HCC)    Other orders  -     metroNIDAZOLE (FLAGYL) 500 MG tablet; Take 1 tablet by mouth 3 (Three) Times a Day for 14 days.  Dispense: 42 tablet; Refill: 0  -     Cancel: sodium chloride 0.9 % bolus 1,000 mL            Patient was given instructions and counseling regarding his condition or for health maintenance advice. Please see specific information pulled into the AVS  if appropriate.     Maggie Coon MD PhD    6/17/2021

## 2021-06-18 NOTE — ADDENDUM NOTE
Encounter addended by: Kristal Velazquez RN on: 6/18/2021 12:57 PM   Actions taken: Flowsheet accepted

## 2021-06-24 ENCOUNTER — OFFICE VISIT (OUTPATIENT)
Dept: ONCOLOGY | Facility: HOSPITAL | Age: 66
End: 2021-06-24

## 2021-06-24 ENCOUNTER — HOSPITAL ENCOUNTER (OUTPATIENT)
Dept: ONCOLOGY | Facility: HOSPITAL | Age: 66
Setting detail: INFUSION SERIES
Discharge: HOME | End: 2021-06-24

## 2021-06-24 VITALS
BODY MASS INDEX: 23.29 KG/M2 | OXYGEN SATURATION: 98 % | WEIGHT: 148.37 LBS | HEART RATE: 117 BPM | TEMPERATURE: 98 F | HEIGHT: 67 IN | SYSTOLIC BLOOD PRESSURE: 106 MMHG | RESPIRATION RATE: 18 BRPM | DIASTOLIC BLOOD PRESSURE: 86 MMHG

## 2021-06-24 VITALS
WEIGHT: 148.37 LBS | HEIGHT: 67 IN | TEMPERATURE: 98 F | RESPIRATION RATE: 18 BRPM | OXYGEN SATURATION: 98 % | DIASTOLIC BLOOD PRESSURE: 86 MMHG | BODY MASS INDEX: 23.29 KG/M2 | SYSTOLIC BLOOD PRESSURE: 106 MMHG | HEART RATE: 117 BPM

## 2021-06-24 DIAGNOSIS — C23 GALLBLADDER CANCER, CARCINOMA (HCC): Primary | ICD-10-CM

## 2021-06-24 LAB
ALBUMIN SERPL-MCNC: 3.12 G/DL (ref 3.5–5.2)
ALBUMIN/GLOB SERPL: 0.8 G/DL
ALP SERPL-CCNC: 532 U/L (ref 39–117)
ALT SERPL W P-5'-P-CCNC: 14 U/L (ref 1–41)
ANION GAP SERPL CALCULATED.3IONS-SCNC: 9.9 MMOL/L (ref 5–15)
AST SERPL-CCNC: 15 U/L (ref 1–40)
BASOPHILS # BLD AUTO: 0.09 10*3/MM3 (ref 0–0.2)
BASOPHILS NFR BLD AUTO: 0.5 % (ref 0–1.5)
BILIRUB SERPL-MCNC: 0.4 MG/DL (ref 0–1.2)
BUN SERPL-MCNC: 17 MG/DL (ref 8–23)
BUN/CREAT SERPL: 25 (ref 7–25)
CALCIUM SPEC-SCNC: 9.1 MG/DL (ref 8.6–10.5)
CHLORIDE SERPL-SCNC: 92 MMOL/L (ref 98–107)
CO2 SERPL-SCNC: 25.1 MMOL/L (ref 22–29)
CREAT SERPL-MCNC: 0.68 MG/DL (ref 0.76–1.27)
DEPRECATED RDW RBC AUTO: 53.5 FL (ref 37–54)
EOSINOPHIL # BLD AUTO: 0.52 10*3/MM3 (ref 0–0.4)
EOSINOPHIL NFR BLD AUTO: 3.1 % (ref 0.3–6.2)
ERYTHROCYTE [DISTWIDTH] IN BLOOD BY AUTOMATED COUNT: 18 % (ref 12.3–15.4)
GFR SERPL CREATININE-BSD FRML MDRD: 117 ML/MIN/1.73
GLOBULIN UR ELPH-MCNC: 4 GM/DL
GLUCOSE SERPL-MCNC: 154 MG/DL (ref 65–99)
HCT VFR BLD AUTO: 34.3 % (ref 37.5–51)
HGB BLD-MCNC: 10.7 G/DL (ref 13–17.7)
IMM GRANULOCYTES # BLD AUTO: 0.09 10*3/MM3 (ref 0–0.05)
IMM GRANULOCYTES NFR BLD AUTO: 0.5 % (ref 0–0.5)
LYMPHOCYTES # BLD AUTO: 1.31 10*3/MM3 (ref 0.7–3.1)
LYMPHOCYTES NFR BLD AUTO: 7.9 % (ref 19.6–45.3)
MCH RBC QN AUTO: 25.3 PG (ref 26.6–33)
MCHC RBC AUTO-ENTMCNC: 31.2 G/DL (ref 31.5–35.7)
MCV RBC AUTO: 81.1 FL (ref 79–97)
MONOCYTES # BLD AUTO: 1.5 10*3/MM3 (ref 0.1–0.9)
MONOCYTES NFR BLD AUTO: 9.1 % (ref 5–12)
NEUTROPHILS NFR BLD AUTO: 13.06 10*3/MM3 (ref 1.7–7)
NEUTROPHILS NFR BLD AUTO: 78.9 % (ref 42.7–76)
PLATELET # BLD AUTO: 462 10*3/MM3 (ref 140–450)
PMV BLD AUTO: 8.2 FL (ref 6–12)
POTASSIUM SERPL-SCNC: 4.4 MMOL/L (ref 3.5–5.2)
PROT SERPL-MCNC: 7.1 G/DL (ref 6–8.5)
RBC # BLD AUTO: 4.23 10*6/MM3 (ref 4.14–5.8)
SODIUM SERPL-SCNC: 127 MMOL/L (ref 136–145)
WBC # BLD AUTO: 16.57 10*3/MM3 (ref 3.4–10.8)

## 2021-06-24 PROCEDURE — 25010000002 PEMBROLIZUMAB 100 MG/4ML SOLUTION 4 ML VIAL: Performed by: INTERNAL MEDICINE

## 2021-06-24 PROCEDURE — 99214 OFFICE O/P EST MOD 30 MIN: CPT | Performed by: INTERNAL MEDICINE

## 2021-06-24 PROCEDURE — 85025 COMPLETE CBC W/AUTO DIFF WBC: CPT | Performed by: INTERNAL MEDICINE

## 2021-06-24 PROCEDURE — 80053 COMPREHEN METABOLIC PANEL: CPT | Performed by: INTERNAL MEDICINE

## 2021-06-24 PROCEDURE — 96413 CHEMO IV INFUSION 1 HR: CPT

## 2021-06-24 PROCEDURE — 25010000002 HEPARIN LOCK FLUSH PER 10 UNITS: Performed by: INTERNAL MEDICINE

## 2021-06-24 RX ORDER — HEPARIN SODIUM (PORCINE) LOCK FLUSH IV SOLN 100 UNIT/ML 100 UNIT/ML
500 SOLUTION INTRAVENOUS AS NEEDED
Status: DISCONTINUED | OUTPATIENT
Start: 2021-06-24 | End: 2021-06-25 | Stop reason: HOSPADM

## 2021-06-24 RX ORDER — SODIUM CHLORIDE 0.9 % (FLUSH) 0.9 %
20 SYRINGE (ML) INJECTION AS NEEDED
Status: DISCONTINUED | OUTPATIENT
Start: 2021-06-24 | End: 2021-06-25 | Stop reason: HOSPADM

## 2021-06-24 RX ORDER — SODIUM CHLORIDE 0.9 % (FLUSH) 0.9 %
20 SYRINGE (ML) INJECTION AS NEEDED
Status: CANCELLED | OUTPATIENT
Start: 2021-06-24

## 2021-06-24 RX ORDER — SODIUM CHLORIDE 0.9 % (FLUSH) 0.9 %
10 SYRINGE (ML) INJECTION AS NEEDED
Status: CANCELLED | OUTPATIENT
Start: 2021-06-24

## 2021-06-24 RX ORDER — HEPARIN SODIUM (PORCINE) LOCK FLUSH IV SOLN 100 UNIT/ML 100 UNIT/ML
300 SOLUTION INTRAVENOUS ONCE
Status: CANCELLED | OUTPATIENT
Start: 2021-06-24

## 2021-06-24 RX ORDER — SODIUM CHLORIDE 9 MG/ML
250 INJECTION, SOLUTION INTRAVENOUS ONCE
Status: COMPLETED | OUTPATIENT
Start: 2021-06-24 | End: 2021-06-24

## 2021-06-24 RX ORDER — HEPARIN SODIUM (PORCINE) LOCK FLUSH IV SOLN 100 UNIT/ML 100 UNIT/ML
500 SOLUTION INTRAVENOUS AS NEEDED
Status: CANCELLED | OUTPATIENT
Start: 2021-06-24

## 2021-06-24 RX ADMIN — SODIUM CHLORIDE, PRESERVATIVE FREE 20 ML: 5 INJECTION INTRAVENOUS at 12:44

## 2021-06-24 RX ADMIN — SODIUM CHLORIDE 250 ML: 9 INJECTION, SOLUTION INTRAVENOUS at 11:58

## 2021-06-24 RX ADMIN — HEPARIN SODIUM (PORCINE) LOCK FLUSH IV SOLN 100 UNIT/ML 500 UNITS: 100 SOLUTION at 12:44

## 2021-06-24 RX ADMIN — SODIUM CHLORIDE 200 MG: 9 INJECTION, SOLUTION INTRAVENOUS at 11:56

## 2021-06-24 NOTE — PROGRESS NOTES
Patient  Elver Sanders    Location  [facility] HEMATOLOGY & ONCOLOGY    Chief Complaint  GALLBLADDER CANCER and Chemotherapy    Referring Provider: Maggie Coon MD PhD  PCP: Provider, No Known    Subjective          Oncology/Hematology History Overview Note   Gallbladder cancer:  Mr. Sanders comes in today with his wife to discuss the plan of care regarding his new diagnosis of gallbladder cancer.  He was referred to me by Dr. Rolando Masters, his general surgeon.    Patient states he is always been in good health and takes no medications.  About 1 month ago he started to have a little bit of epigastric abdominal pain.  Then the pain got significantly worse and he went to the emergency room.  There and ultrasound revealed thickening of the gallbladder.  He underwent a simple cholecystectomy on 8/29/2020.  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  The tumor perforated through the serosa.  Reports indicate the gallbladder ruptured into the surgical cavity during the procedure.  Dr. Thayer attempted to wash the peritoneal cavity.    11/18/2020: Laparoscopy with biopsies revealed peritoneal carcinomatosis.  Biopsies were positive for metastatic undifferentiated carcinoma, morphologically consistent with known gallbladder primary.  Possible liver metastases that were seen on imaging at  were found to be on the surface of the liver instead.  He was also found to have a mass around his colon.  The recommendation was for colonoscopy which the patient had recently.  The colonoscopy was negative for evidence of colon cancer.    C1D1 of Thayer/Cis. C2D1 on 1/21/21 2/4/21 (Comparison image from 11/4/20 at  not available at the time of this read):   CT chest:  1. No evidence of metastatic disease within the chest.    2. Elevated right hemidiaphragm with bandlike atelectasis within the right middle lobe and right lower lobe.      CT abdomen and pelvis:  1. Abnormal low-density perihepatic lesions which may represent capsular metastasis or mucinous pseudomyxoma peritonei.  2. Infiltrative soft tissue within the gallbladder fossa with suspected fistulization to the hepatic flexure.  3. Abnormal low-density mass involving the pancreatic head and body without ductal obstruction or vascular occlusion.  4. Abnormal low-density the lesions along the posterolateral aspect of the cecum and ascending colon.     [facility] Molecular Report: PDL1 3+ (80%), TMB low (6mut/Mb), MSI-S     C1 Keytruda on 3/2/21.  He enrolled in Hospice just after C2 of Keytruda but then clinically improved and restarted Keytruda on 4/16/21.     Gallbladder malignant neoplasm (CMS/HCC) (Resolved)   5/19/2021 Initial Diagnosis    Gallbladder malignant neoplasm (CMS/HCC)     Gallbladder cancer, carcinoma (CMS/HCC)   3/2/2021 -  Chemotherapy    OP Pembrolizumab 200 mg     6/15/2021 Initial Diagnosis    Gallbladder cancer, carcinoma (CMS/HCC)     6/17/2021 -  Chemotherapy    OP CENTRAL VENOUS ACCESS DEVICE ACCESS, CARE, AND MAINTENANCE (CVAD)     6/17/2021 -  Chemotherapy    OP SUPPORTIVE HYDRATION + ANTIEMETICS         History of Present Illness  The pt comes in today for C6 of Keytruda.  He is feeling about the same compared to last week. He is still weak and has very little appetite.  He continues to deny fever or abdominal pain.  He is drinking more water.     I reviewed the results of his labs today. His WBC count has improved to 16.6. Last week it was 21.8. His hemoglobin is stable at 10.7 and platelet count slightly improved at 462. This suggest that there is a reduction in the inflammation in his body. Also his creatinine stable at 0.68. Sodium remains low/slightly worse at 127. Most concerning is that his alkaline phosphatase has jumped up to 532. His bilirubin and AST/ALT are still within normal limits.    Review of Systems   Constitutional: Positive for fatigue. Negative  for appetite change, diaphoresis, fever, unexpected weight gain and unexpected weight loss.   HENT: Negative for hearing loss, mouth sores, sore throat, swollen glands, trouble swallowing and voice change.    Eyes: Negative for blurred vision.   Respiratory: Negative for cough, shortness of breath and wheezing.    Cardiovascular: Negative for chest pain and palpitations.   Gastrointestinal: Negative for abdominal pain, blood in stool, constipation, diarrhea, nausea and vomiting.   Endocrine: Negative for cold intolerance and heat intolerance.   Genitourinary: Negative for difficulty urinating, dysuria, frequency, hematuria and urinary incontinence.   Musculoskeletal: Negative for arthralgias, back pain and myalgias.   Skin: Negative for rash, skin lesions and bruise.   Neurological: Negative for dizziness, seizures, weakness, numbness and headache.   Hematological: Does not bruise/bleed easily.   Psychiatric/Behavioral: Negative for depressed mood. The patient is not nervous/anxious.        Past Medical History:   Diagnosis Date   • Gallbladder cancer (CMS/HCC)    • Skin cancer      Past Surgical History:   Procedure Laterality Date   • CHOLECYSTECTOMY     • SKIN CANCER EXCISION       Social History     Socioeconomic History   • Marital status:      Spouse name: Not on file   • Number of children: 2   • Years of education: Not on file   • Highest education level: Not on file   Tobacco Use   • Smoking status: Former Smoker   • Smokeless tobacco: Current User     Types: Chew   • Tobacco comment: DIPS TOBACCO DAILY   Substance and Sexual Activity   • Alcohol use: Yes     Comment: 0-2 DRINKS PER DAY   • Drug use: Never     Family History   Problem Relation Age of Onset   • Breast cancer Mother        Objective   Physical Exam  General: Alert, cooperative, no acute distress, but chronically ill-appearing and fatigued  Eyes: Anicteric sclera, PERRLA  Respiratory: CTAB, normal respiratory effort  Abdomen: Normal  "active bowel sounds, no tenderness, no distention  Cardiovascular: RRR, no murmur, no lower extremity edema  Skin: Normal tone, no rash, no lesions  Psychiatric: Appropriate affect, intact judgment  Neurologic: No focal sensory or motor deficits, no weakness, numbness, dizziness  Musculoskeletal: Reduced muscle strength and tone, becoming more thin  Extremities: No clubbing, cyanosis, or deformities      Vitals:    06/24/21 1048   BP: 106/86   Pulse: 117   Resp: 18   Temp: 98 °F (36.7 °C)   TempSrc: Temporal   SpO2: 98%   Weight: 67.3 kg (148 lb 5.9 oz)   Height: 169.5 cm (66.73\")   PainSc:   5   PainLoc: Generalized               PHQ-9 Total Score:         Result Review :   The following data was reviewed by: Maggie Coon MD PhD on 06/24/2021:  Lab Results   Component Value Date    HGB 10.7 (L) 06/24/2021    HCT 34.3 (L) 06/24/2021    MCV 81.1 06/24/2021     (H) 06/24/2021    WBC 16.57 (H) 06/24/2021    NEUTROABS 13.06 (H) 06/24/2021    LYMPHSABS 1.31 06/24/2021    MONOSABS 1.50 (H) 06/24/2021    EOSABS 0.52 (H) 06/24/2021    BASOSABS 0.09 06/24/2021     Lab Results   Component Value Date    GLUCOSE 154 (H) 06/24/2021    BUN 17 06/24/2021    CREATININE 0.68 (L) 06/24/2021     (L) 06/24/2021    K 4.4 06/24/2021    CL 92 (L) 06/24/2021    CO2 25.1 06/24/2021    CALCIUM 9.1 06/24/2021    PROTEINTOT 7.1 06/24/2021    ALBUMIN 3.12 (L) 06/24/2021    BILITOT 0.4 06/24/2021    ALKPHOS 532 (H) 06/24/2021    AST 15 06/24/2021    ALT 14 06/24/2021          Assessment and Plan    Diagnoses and all orders for this visit:    1. Gallbladder cancer, carcinoma (CMS/HCC) (Primary)      Patient is here today for his next likely Keytruda. I discussed the results of the recent CT scan with the patient and his wife again. I am concerned that he has had 1-2 areas of worsening disease while others have improved. I believe he got some benefit from the antibiotic treatment and has no signs of worsening infection. I " reviewed his labs and they are adequate for treatment today. He will proceed with treatment but I would like for him to see our radiation oncologist and surgeon at the [facility]. If there is any local therapy that is an option for this patient I believe it would be his best option at this point. The only other chemotherapy regimen that he may be a good candidate for is irinotecan single agent. However, I am unsure of the response rate of his treatment. I am skeptical he would have meaningful benefit.   Patient follow-up with me in 3 weeks for his next cycle of Keytruda.    Microcytic anemia: Likely secondary to acute inflammation.  His ferritin is over 1200.  Although his transferrin saturation is low at 12% I would not recommend starting oral or IV iron at this time.  We will continue to monitor his hemoglobin.    Patient was given instructions and counseling regarding his condition or for health maintenance advice. Please see specific information pulled into the AVS if appropriate.     Maggie Coon MD PhD    6/24/2021

## 2021-06-25 ENCOUNTER — TELEPHONE (OUTPATIENT)
Dept: ONCOLOGY | Facility: HOSPITAL | Age: 66
End: 2021-06-25

## 2021-06-25 NOTE — TELEPHONE ENCOUNTER
Kari called to make Dr Coon aware that the pt had the Diarrhea to come back last night. Kari states that the pt has had several episodes.

## 2021-06-25 NOTE — TELEPHONE ENCOUNTER
Left message for patient to call answering service this weekend if diarrhea continued and is not relieved by current medication.

## 2021-07-01 ENCOUNTER — TELEPHONE (OUTPATIENT)
Dept: ONCOLOGY | Facility: HOSPITAL | Age: 66
End: 2021-07-01

## 2021-07-01 DIAGNOSIS — R19.7 DIARRHEA, UNSPECIFIED TYPE: Primary | ICD-10-CM

## 2021-07-01 RX ORDER — DIPHENOXYLATE HYDROCHLORIDE AND ATROPINE SULFATE 2.5; .025 MG/1; MG/1
1 TABLET ORAL 4 TIMES DAILY PRN
Qty: 60 TABLET | Refills: 0 | Status: SHIPPED | OUTPATIENT
Start: 2021-07-01 | End: 2021-07-23

## 2021-07-02 ENCOUNTER — HOSPITAL ENCOUNTER (OUTPATIENT)
Dept: RADIATION ONCOLOGY | Facility: HOSPITAL | Age: 66
Setting detail: RADIATION/ONCOLOGY SERIES
End: 2021-07-02

## 2021-07-02 ENCOUNTER — CONSULT (OUTPATIENT)
Dept: RADIATION ONCOLOGY | Facility: HOSPITAL | Age: 66
End: 2021-07-02

## 2021-07-02 ENCOUNTER — LAB (OUTPATIENT)
Dept: LAB | Facility: HOSPITAL | Age: 66
End: 2021-07-02

## 2021-07-02 VITALS
HEART RATE: 109 BPM | TEMPERATURE: 98.5 F | SYSTOLIC BLOOD PRESSURE: 102 MMHG | BODY MASS INDEX: 21.38 KG/M2 | RESPIRATION RATE: 16 BRPM | WEIGHT: 141.09 LBS | DIASTOLIC BLOOD PRESSURE: 71 MMHG | HEIGHT: 68 IN | OXYGEN SATURATION: 98 %

## 2021-07-02 DIAGNOSIS — C23 GALLBLADDER CANCER (HCC): Primary | ICD-10-CM

## 2021-07-02 DIAGNOSIS — R19.7 DIARRHEA, UNSPECIFIED TYPE: ICD-10-CM

## 2021-07-02 LAB
027 TOXIN: ABNORMAL
C DIFF TOX GENS STL QL NAA+PROBE: POSITIVE

## 2021-07-02 PROCEDURE — 87505 NFCT AGENT DETECTION GI: CPT

## 2021-07-02 PROCEDURE — G0463 HOSPITAL OUTPT CLINIC VISIT: HCPCS | Performed by: RADIOLOGY

## 2021-07-02 PROCEDURE — 99214 OFFICE O/P EST MOD 30 MIN: CPT | Performed by: RADIOLOGY

## 2021-07-02 PROCEDURE — 87493 C DIFF AMPLIFIED PROBE: CPT

## 2021-07-03 ENCOUNTER — TELEPHONE (OUTPATIENT)
Dept: ONCOLOGY | Facility: HOSPITAL | Age: 66
End: 2021-07-03

## 2021-07-03 LAB
C COLI+JEJ+UPSA DNA STL QL NAA+NON-PROBE: NOT DETECTED
EC STX1+STX2 GENES STL QL NAA+NON-PROBE: NOT DETECTED
S ENT+BONG DNA STL QL NAA+NON-PROBE: NOT DETECTED
SHIGELLA SP+EIEC IPAH ST NAA+NON-PROBE: NOT DETECTED

## 2021-07-03 RX ORDER — VANCOMYCIN HYDROCHLORIDE 125 MG/1
125 CAPSULE ORAL 4 TIMES DAILY
Qty: 56 CAPSULE | Refills: 0 | Status: SHIPPED | OUTPATIENT
Start: 2021-07-03 | End: 2021-07-17

## 2021-07-03 NOTE — TELEPHONE ENCOUNTER
I spoke with the patient's wife and told her that he has Cdiff. I will send a prescription for oral vancomycin to [facility] in [facility].

## 2021-07-12 ENCOUNTER — TELEPHONE (OUTPATIENT)
Dept: ONCOLOGY | Facility: HOSPITAL | Age: 66
End: 2021-07-12

## 2021-07-12 NOTE — TELEPHONE ENCOUNTER
Wife reports patient is still having diarrhea. States it was down to 2-3 loose bms/day, but increased last night and today to 4-6 bms. Informed her we will delay treatment for 2 weeks. Also, discussed extending Vancomycin and adding Flagyl for diarrhea.

## 2021-07-12 NOTE — TELEPHONE ENCOUNTER
Kari called and states the pt is on antibiotics for C-Diff and will finish the antibiotics on 7/17/21. pt has a tx and f/u apt on 7/15/21. Kari asking if he can still come to the apts. This nurse stated most likely not. Kari states that he has been on the antibiotic for over a week. Kari was informed that this nurse would notify Ashleigh RDZ.

## 2021-07-13 DIAGNOSIS — C23 GALLBLADDER CANCER, CARCINOMA (HCC): Primary | ICD-10-CM

## 2021-07-15 ENCOUNTER — APPOINTMENT (OUTPATIENT)
Dept: ONCOLOGY | Facility: HOSPITAL | Age: 66
End: 2021-07-15

## 2021-07-19 ENCOUNTER — TELEPHONE (OUTPATIENT)
Dept: ONCOLOGY | Facility: HOSPITAL | Age: 66
End: 2021-07-19

## 2021-07-19 NOTE — TELEPHONE ENCOUNTER
[facility] called and states the pt fell Friday last week and bumped his head but was able get himself up and is ok. [facility] States that the pt still has Diarrhea, poor appetite, and weakness. [facility] state the pt is not getting out of bed much at all.

## 2021-07-19 NOTE — TELEPHONE ENCOUNTER
Spoke with Kari, patient's wife. Patient has no residual effects from fall. Wife states still having diarrhea, but it seems to be a little better. Reports his appetite has improved slightly. Patient has an appointment to see Dr. Coon this Friday and wife does not feel he needs to be seen sooner. Discussed GI evaluation for diarrhea. Wife will call in 1-2 days if diarrhea continues.

## 2021-07-21 ENCOUNTER — TELEPHONE (OUTPATIENT)
Dept: ONCOLOGY | Facility: HOSPITAL | Age: 66
End: 2021-07-21

## 2021-07-21 DIAGNOSIS — R19.7 DIARRHEA, UNSPECIFIED TYPE: Primary | ICD-10-CM

## 2021-07-23 ENCOUNTER — LAB (OUTPATIENT)
Dept: ONCOLOGY | Facility: HOSPITAL | Age: 66
End: 2021-07-23

## 2021-07-23 ENCOUNTER — OFFICE VISIT (OUTPATIENT)
Dept: ONCOLOGY | Facility: HOSPITAL | Age: 66
End: 2021-07-23

## 2021-07-23 VITALS
BODY MASS INDEX: 19.84 KG/M2 | SYSTOLIC BLOOD PRESSURE: 98 MMHG | OXYGEN SATURATION: 100 % | TEMPERATURE: 97.6 F | RESPIRATION RATE: 18 BRPM | HEART RATE: 114 BPM | WEIGHT: 130.51 LBS | DIASTOLIC BLOOD PRESSURE: 66 MMHG

## 2021-07-23 DIAGNOSIS — A04.72 COLITIS, CLOSTRIDIUM DIFFICILE: ICD-10-CM

## 2021-07-23 DIAGNOSIS — C23 GALLBLADDER CANCER, CARCINOMA (HCC): ICD-10-CM

## 2021-07-23 DIAGNOSIS — R19.7 DIARRHEA, UNSPECIFIED TYPE: Primary | ICD-10-CM

## 2021-07-23 DIAGNOSIS — M25.552 LEFT HIP PAIN: ICD-10-CM

## 2021-07-23 LAB
ALBUMIN SERPL-MCNC: 3.16 G/DL (ref 3.5–5.2)
ALBUMIN/GLOB SERPL: 0.7 G/DL
ALP SERPL-CCNC: 164 U/L (ref 39–117)
ALT SERPL W P-5'-P-CCNC: 10 U/L (ref 1–41)
ANION GAP SERPL CALCULATED.3IONS-SCNC: 9.5 MMOL/L (ref 5–15)
AST SERPL-CCNC: 8 U/L (ref 1–40)
BASOPHILS # BLD AUTO: 0.06 10*3/MM3 (ref 0–0.2)
BASOPHILS NFR BLD AUTO: 0.3 % (ref 0–1.5)
BILIRUB SERPL-MCNC: 0.3 MG/DL (ref 0–1.2)
BUN SERPL-MCNC: 22 MG/DL (ref 8–23)
BUN/CREAT SERPL: 34.4 (ref 7–25)
CALCIUM SPEC-SCNC: 9.5 MG/DL (ref 8.6–10.5)
CHLORIDE SERPL-SCNC: 95 MMOL/L (ref 98–107)
CO2 SERPL-SCNC: 26.5 MMOL/L (ref 22–29)
CREAT SERPL-MCNC: 0.64 MG/DL (ref 0.76–1.27)
DEPRECATED RDW RBC AUTO: 44.1 FL (ref 37–54)
EOSINOPHIL # BLD AUTO: 0.72 10*3/MM3 (ref 0–0.4)
EOSINOPHIL NFR BLD AUTO: 3.7 % (ref 0.3–6.2)
ERYTHROCYTE [DISTWIDTH] IN BLOOD BY AUTOMATED COUNT: 15.1 % (ref 12.3–15.4)
GFR SERPL CREATININE-BSD FRML MDRD: 126 ML/MIN/1.73
GLOBULIN UR ELPH-MCNC: 4.3 GM/DL
GLUCOSE SERPL-MCNC: 136 MG/DL (ref 65–99)
HCT VFR BLD AUTO: 30.7 % (ref 37.5–51)
HGB BLD-MCNC: 9.6 G/DL (ref 13–17.7)
IMM GRANULOCYTES # BLD AUTO: 0.08 10*3/MM3 (ref 0–0.05)
IMM GRANULOCYTES NFR BLD AUTO: 0.4 % (ref 0–0.5)
LYMPHOCYTES # BLD AUTO: 1.67 10*3/MM3 (ref 0.7–3.1)
LYMPHOCYTES NFR BLD AUTO: 8.6 % (ref 19.6–45.3)
MCH RBC QN AUTO: 25.1 PG (ref 26.6–33)
MCHC RBC AUTO-ENTMCNC: 31.3 G/DL (ref 31.5–35.7)
MCV RBC AUTO: 80.4 FL (ref 79–97)
MONOCYTES # BLD AUTO: 1.15 10*3/MM3 (ref 0.1–0.9)
MONOCYTES NFR BLD AUTO: 5.9 % (ref 5–12)
NEUTROPHILS NFR BLD AUTO: 15.74 10*3/MM3 (ref 1.7–7)
NEUTROPHILS NFR BLD AUTO: 81.1 % (ref 42.7–76)
PLATELET # BLD AUTO: 609 10*3/MM3 (ref 140–450)
PMV BLD AUTO: 8.2 FL (ref 6–12)
POTASSIUM SERPL-SCNC: 3.8 MMOL/L (ref 3.5–5.2)
PROT SERPL-MCNC: 7.5 G/DL (ref 6–8.5)
RBC # BLD AUTO: 3.82 10*6/MM3 (ref 4.14–5.8)
SODIUM SERPL-SCNC: 131 MMOL/L (ref 136–145)
WBC # BLD AUTO: 19.42 10*3/MM3 (ref 3.4–10.8)

## 2021-07-23 PROCEDURE — 99214 OFFICE O/P EST MOD 30 MIN: CPT | Performed by: INTERNAL MEDICINE

## 2021-07-23 PROCEDURE — G0463 HOSPITAL OUTPT CLINIC VISIT: HCPCS | Performed by: INTERNAL MEDICINE

## 2021-07-23 PROCEDURE — 80053 COMPREHEN METABOLIC PANEL: CPT

## 2021-07-23 PROCEDURE — 36415 COLL VENOUS BLD VENIPUNCTURE: CPT

## 2021-07-23 PROCEDURE — 85025 COMPLETE CBC W/AUTO DIFF WBC: CPT

## 2021-07-23 PROCEDURE — G0463 HOSPITAL OUTPT CLINIC VISIT: HCPCS

## 2021-07-23 RX ORDER — PREDNISONE 10 MG/1
40 TABLET ORAL DAILY
Qty: 20 TABLET | Refills: 1 | Status: SHIPPED | OUTPATIENT
Start: 2021-07-23 | End: 2021-07-29

## 2021-07-23 RX ORDER — MONTELUKAST SODIUM 4 MG/1
2 TABLET, CHEWABLE ORAL 2 TIMES DAILY
Qty: 120 TABLET | Refills: 0 | Status: SHIPPED | OUTPATIENT
Start: 2021-07-23

## 2021-07-23 RX ORDER — VANCOMYCIN HYDROCHLORIDE 125 MG/1
CAPSULE ORAL
Qty: 126 CAPSULE | Refills: 0 | Status: SHIPPED | OUTPATIENT
Start: 2021-07-23 | End: 2021-09-03

## 2021-07-23 RX ORDER — LOPERAMIDE HYDROCHLORIDE 2 MG/1
2 TABLET ORAL 4 TIMES DAILY PRN
Qty: 90 TABLET | Refills: 3 | Status: SHIPPED | OUTPATIENT
Start: 2021-07-23

## 2021-07-29 ENCOUNTER — OFFICE VISIT (OUTPATIENT)
Dept: GASTROENTEROLOGY | Facility: CLINIC | Age: 66
End: 2021-07-29

## 2021-07-29 VITALS
SYSTOLIC BLOOD PRESSURE: 97 MMHG | HEIGHT: 68 IN | BODY MASS INDEX: 18.79 KG/M2 | HEART RATE: 107 BPM | DIASTOLIC BLOOD PRESSURE: 69 MMHG | OXYGEN SATURATION: 100 % | WEIGHT: 124 LBS

## 2021-07-29 DIAGNOSIS — R19.7 DIARRHEA, UNSPECIFIED TYPE: ICD-10-CM

## 2021-07-29 DIAGNOSIS — C23 GALLBLADDER CANCER, CARCINOMA (HCC): Primary | ICD-10-CM

## 2021-07-29 DIAGNOSIS — A04.72 COLITIS, CLOSTRIDIUM DIFFICILE: ICD-10-CM

## 2021-07-29 PROCEDURE — 99204 OFFICE O/P NEW MOD 45 MIN: CPT | Performed by: INTERNAL MEDICINE

## 2021-07-29 NOTE — PROGRESS NOTES
Chief Complaint      Elver Sanders is a 66 y.o. male who presents to [facility] GASTROENTEROLOGY for evaluation of diarrhea.  Patient is currently being treated for gallbladder cancer with peritoneal carcinomatosis.  He unfortunately developed diarrhea starting in June he was found to have a positive C. difficile July 3.  He describes having 15-20 watery stools per day but sometimes unable to tell if he is going to pass gas versus liquid stool.  He denies significant abdominal pain.  He has lost significant amount of weight.  He is followed by Dr. Coon.  All of his therapy is currently on hold given his profound diarrhea.  He denies rectal bleeding.  His gallbladder was surgically removed by Dr. Thayer at the time of diagnosis.  Patient did have a colonoscopy November 2020 that showed no acute findings and no colitis.    Result Review :   The following data was reviewed by: Oziel Castro MD on 07/29/2021:    CMP    CMP 6/17/21 6/24/21 7/23/21   Glucose 202 (A) 154 (A) 136 (A)   BUN 11 17 22   Creatinine 0.54 (A) 0.68 (A) 0.64 (A)   eGFR Non African Am >150 117 126   Sodium 130 (A) 127 (A) 131 (A)   Potassium 3.4 (A) 4.4 3.8   Chloride 93 (A) 92 (A) 95 (A)   Calcium 9.3 9.1 9.5   Albumin 3.25 (A) 3.12 (A) 3.16 (A)   Total Bilirubin 0.4 0.4 0.3   Alkaline Phosphatase 243 (A) 532 (A) 164 (A)   AST (SGOT) 25 15 8   ALT (SGPT) 29 14 10   (A) Abnormal value            CBC    CBC 6/17/21 6/24/21 7/23/21   WBC 21.80 (A) 16.57 (A) 19.42 (A)   RBC 4.11 (A) 4.23 3.82 (A)   Hemoglobin 10.6 (A) 10.7 (A) 9.6 (A)   Hematocrit 32.6 (A) 34.3 (A) 30.7 (A)   MCV 79.3 81.1 80.4   MCH 25.8 (A) 25.3 (A) 25.1 (A)   MCHC 32.5 31.2 (A) 31.3 (A)   RDW 17.5 (A) 18.0 (A) 15.1   Platelets 534 (A) 462 (A) 609 (A)   (A) Abnormal value       Comments are available for some flowsheets but are not being displayed.             Data reviewed: GI studies C. difficile toxin positive from July 3, 2021//Colonoscopy  reviewed from November 2020     Past Medical History:   Diagnosis Date   • Gallbladder cancer (CMS/HCC)    • Skin cancer        Past Surgical History:   Procedure Laterality Date   • CHOLECYSTECTOMY     • COLONOSCOPY  2020   • HAND SURGERY     • SKIN CANCER EXCISION           Current Outpatient Medications:   •  colestipol (COLESTID) 1 g tablet, Take 2 tablets by mouth 2 (Two) Times a Day., Disp: 120 tablet, Rfl: 0  •  dronabinol (MARINOL) 10 MG capsule, Take 10 mg by mouth 2 (Two) Times a Day., Disp: , Rfl:   •  DULoxetine (CYMBALTA) 30 MG capsule, Take 30 mg by mouth Daily., Disp: , Rfl:   •  HYDROcodone-acetaminophen (Norco) 7.5-325 MG per tablet, Norco 7.5-325 mg oral tablet take 1 tablet by oral route every 6 hours as needed for pain   Active, Disp: , Rfl:   •  hydrOXYzine (ATARAX) 25 MG tablet, Take 25 mg by mouth At Night As Needed., Disp: , Rfl:   •  loperamide (Imodium A-D) 2 MG tablet, Take 1 tablet by mouth 4 (Four) Times a Day As Needed for Diarrhea., Disp: 90 tablet, Rfl: 3  •  Mouthwashes (Biotene Dry Mouth Gentle) liquid, Apply 5 mL to the mouth or throat 4 (Four) Times a Day., Disp: 473 mL, Rfl: 3  •  OLANZapine (zyPREXA) 10 MG tablet, Take 10 mg by mouth every night at bedtime., Disp: , Rfl:   •  ondansetron ODT (ZOFRAN-ODT) 8 MG disintegrating tablet, DISSOLVE ONE TABLET BY MOUTH EVERY 8 HOURS FOR FORNAUSEA AND VOMITING, Disp: , Rfl:   •  vancomycin (Vancocin HCl) 125 MG capsule, Take 1 capsule by mouth 4 (Four) Times a Day for 14 days, THEN 1 capsule 3 (Three) Times a Day for 14 days, THEN 1 capsule 2 (Two) Times a Day for 14 days., Disp: 126 capsule, Rfl: 0     No Known Allergies    Family History   Problem Relation Age of Onset   • Breast cancer Mother    • Cancer Maternal Aunt    • Cancer Paternal Uncle         Social History     Social History Narrative   • Not on file       Review of Systems -all other systems reviewed and are otherwise negative except for that mentioned previously in the  "HPI    Objective     Vital Signs:   BP 97/69   Pulse 107   Ht 172.7 cm (68\")   Wt 56.2 kg (124 lb)   SpO2 100%   BMI 18.85 kg/m²     Body mass index is 18.85 kg/m².    Physical Exam  Constitutional:       General: He is not in acute distress.     Appearance: He is well-developed.      Comments: Thin frail in appearance   Eyes:      Conjunctiva/sclera: Conjunctivae normal.      Pupils: Pupils are equal, round, and reactive to light.      Visual Fields: Right eye visual fields normal and left eye visual fields normal.   Cardiovascular:      Rate and Rhythm: Normal rate and regular rhythm.      Heart sounds: Normal heart sounds.   Pulmonary:      Effort: Pulmonary effort is normal. No retractions.      Breath sounds: Normal breath sounds and air entry.      Comments: Inspection of chest: normal appearance  Abdominal:      General: Bowel sounds are normal.      Palpations: Abdomen is soft.      Tenderness: There is no abdominal tenderness.      Comments: No appreciable hepatosplenomegaly   Musculoskeletal:      Cervical back: Neck supple.      Right lower leg: No edema.      Left lower leg: No edema.   Lymphadenopathy:      Cervical: No cervical adenopathy.   Skin:     Findings: No lesion.      Comments: Turgor normal   Neurological:      Mental Status: He is alert and oriented to person, place, and time.   Psychiatric:         Mood and Affect: Mood and affect normal.                 Assessment and Plan    Diagnoses and all orders for this visit:    1. Gallbladder cancer, carcinoma (CMS/HCC) (Primary)    2. Diarrhea, unspecified type    3. Colitis, Clostridium difficile    The patient finally appears to have improved with initiation of colestipol 2 g p.o. twice daily as well as a vancomycin taper prescribed for 6 weeks.  He had a colonoscopy within the past year and therefore I would prefer not to repeat his colonoscopy in the setting of active C. difficile infection.  I think having no gallbladder contributes to " his diarrhea and colestipol seemingly has improved his symptoms down to less than 5 stools per day.  I discussed with him and his wife that I recommend increasing his colestipol as needed at 1st-3 tablets twice daily and then 4 tablets twice daily if needed.  I will continue his vancomycin taper as follows; 125 mg p.o. 4 times daily for 2 weeks, 3 times daily for 2 weeks, twice daily for 2 weeks, daily for 2 weeks, then every other day for 2 weeks, then discontinue as tolerated.  If he has a relapse of C. difficile thereafter then stool transplant can be considered.  Additionally I discussed that it is okay for the patient to increase his Imodium use up to 8 tablets/day              Follow Up   No follow-ups on file.  Patient was given instructions and counseling regarding his condition or for health maintenance advice. Please see specific information pulled into the AVS if appropriate.

## 2021-07-30 ENCOUNTER — APPOINTMENT (OUTPATIENT)
Dept: ONCOLOGY | Facility: HOSPITAL | Age: 66
End: 2021-07-30

## 2021-08-02 ENCOUNTER — TELEPHONE (OUTPATIENT)
Dept: ONCOLOGY | Facility: HOSPITAL | Age: 66
End: 2021-08-02

## 2021-08-02 NOTE — TELEPHONE ENCOUNTER
Kari called and states that the pt's diarrhea is not any better. Kari states she changed the pt's brief's x15 yesterday. Kari requesting to speak to Ashleigh RNor Dr Coon.

## 2021-08-02 NOTE — TELEPHONE ENCOUNTER
Left message for Kari instructing her to contact Dr. Castro's office (GI) about the continued diarrhea. Instructed her to call me back if she was not able to speak with someone in that office.

## 2021-08-03 ENCOUNTER — OFFICE VISIT (OUTPATIENT)
Dept: ONCOLOGY | Facility: HOSPITAL | Age: 66
End: 2021-08-03

## 2021-08-03 ENCOUNTER — HOSPITAL ENCOUNTER (OUTPATIENT)
Dept: ONCOLOGY | Facility: HOSPITAL | Age: 66
Setting detail: INFUSION SERIES
Discharge: HOME | End: 2021-08-03

## 2021-08-03 VITALS
DIASTOLIC BLOOD PRESSURE: 66 MMHG | RESPIRATION RATE: 16 BRPM | TEMPERATURE: 97.4 F | HEART RATE: 102 BPM | SYSTOLIC BLOOD PRESSURE: 94 MMHG | WEIGHT: 119.27 LBS | HEIGHT: 67 IN | BODY MASS INDEX: 18.72 KG/M2 | OXYGEN SATURATION: 100 %

## 2021-08-03 VITALS
BODY MASS INDEX: 18.72 KG/M2 | TEMPERATURE: 97.4 F | HEART RATE: 102 BPM | RESPIRATION RATE: 16 BRPM | SYSTOLIC BLOOD PRESSURE: 94 MMHG | WEIGHT: 119.27 LBS | DIASTOLIC BLOOD PRESSURE: 66 MMHG | OXYGEN SATURATION: 100 % | HEIGHT: 67 IN

## 2021-08-03 DIAGNOSIS — C23 GALLBLADDER CANCER, CARCINOMA (HCC): ICD-10-CM

## 2021-08-03 DIAGNOSIS — A04.72 COLITIS, CLOSTRIDIUM DIFFICILE: ICD-10-CM

## 2021-08-03 DIAGNOSIS — A09 DIARRHEA OF INFECTIOUS ORIGIN: ICD-10-CM

## 2021-08-03 DIAGNOSIS — C23 GALLBLADDER CANCER, CARCINOMA (HCC): Primary | ICD-10-CM

## 2021-08-03 DIAGNOSIS — E86.0 DEHYDRATION: Primary | ICD-10-CM

## 2021-08-03 LAB
ALBUMIN SERPL-MCNC: 2.73 G/DL (ref 3.5–5.2)
ALBUMIN/GLOB SERPL: 0.8 G/DL
ALP SERPL-CCNC: 138 U/L (ref 39–117)
ALT SERPL W P-5'-P-CCNC: 4 U/L (ref 1–41)
ANION GAP SERPL CALCULATED.3IONS-SCNC: 8.3 MMOL/L (ref 5–15)
AST SERPL-CCNC: 4 U/L (ref 1–40)
BASOPHILS # BLD AUTO: 0.06 10*3/MM3 (ref 0–0.2)
BASOPHILS NFR BLD AUTO: 0.3 % (ref 0–1.5)
BILIRUB SERPL-MCNC: 0.4 MG/DL (ref 0–1.2)
BUN SERPL-MCNC: 22 MG/DL (ref 8–23)
BUN/CREAT SERPL: 40.7 (ref 7–25)
CALCIUM SPEC-SCNC: 8.2 MG/DL (ref 8.6–10.5)
CHLORIDE SERPL-SCNC: 98 MMOL/L (ref 98–107)
CO2 SERPL-SCNC: 22.7 MMOL/L (ref 22–29)
CREAT SERPL-MCNC: 0.54 MG/DL (ref 0.76–1.27)
DEPRECATED RDW RBC AUTO: 43.2 FL (ref 37–54)
EOSINOPHIL # BLD AUTO: 0.85 10*3/MM3 (ref 0–0.4)
EOSINOPHIL NFR BLD AUTO: 4.1 % (ref 0.3–6.2)
ERYTHROCYTE [DISTWIDTH] IN BLOOD BY AUTOMATED COUNT: 14.9 % (ref 12.3–15.4)
GFR SERPL CREATININE-BSD FRML MDRD: >150 ML/MIN/1.73
GLOBULIN UR ELPH-MCNC: 3.4 GM/DL
GLUCOSE SERPL-MCNC: 141 MG/DL (ref 65–99)
HCT VFR BLD AUTO: 29.9 % (ref 37.5–51)
HGB BLD-MCNC: 9.3 G/DL (ref 13–17.7)
IMM GRANULOCYTES # BLD AUTO: 0.08 10*3/MM3 (ref 0–0.05)
IMM GRANULOCYTES NFR BLD AUTO: 0.4 % (ref 0–0.5)
LYMPHOCYTES # BLD AUTO: 1.55 10*3/MM3 (ref 0.7–3.1)
LYMPHOCYTES NFR BLD AUTO: 7.5 % (ref 19.6–45.3)
MCH RBC QN AUTO: 24.7 PG (ref 26.6–33)
MCHC RBC AUTO-ENTMCNC: 31.1 G/DL (ref 31.5–35.7)
MCV RBC AUTO: 79.3 FL (ref 79–97)
MONOCYTES # BLD AUTO: 1.44 10*3/MM3 (ref 0.1–0.9)
MONOCYTES NFR BLD AUTO: 7 % (ref 5–12)
NEUTROPHILS NFR BLD AUTO: 16.63 10*3/MM3 (ref 1.7–7)
NEUTROPHILS NFR BLD AUTO: 80.7 % (ref 42.7–76)
PLATELET # BLD AUTO: 244 10*3/MM3 (ref 140–450)
PMV BLD AUTO: 8.6 FL (ref 6–12)
POTASSIUM SERPL-SCNC: 4.1 MMOL/L (ref 3.5–5.2)
PROT SERPL-MCNC: 6.1 G/DL (ref 6–8.5)
RBC # BLD AUTO: 3.77 10*6/MM3 (ref 4.14–5.8)
SODIUM SERPL-SCNC: 129 MMOL/L (ref 136–145)
WBC # BLD AUTO: 20.61 10*3/MM3 (ref 3.4–10.8)

## 2021-08-03 PROCEDURE — 85025 COMPLETE CBC W/AUTO DIFF WBC: CPT | Performed by: INTERNAL MEDICINE

## 2021-08-03 PROCEDURE — 96360 HYDRATION IV INFUSION INIT: CPT

## 2021-08-03 PROCEDURE — 25010000002 HEPARIN LOCK FLUSH PER 10 UNITS: Performed by: INTERNAL MEDICINE

## 2021-08-03 PROCEDURE — 80053 COMPREHEN METABOLIC PANEL: CPT | Performed by: INTERNAL MEDICINE

## 2021-08-03 PROCEDURE — 99215 OFFICE O/P EST HI 40 MIN: CPT | Performed by: INTERNAL MEDICINE

## 2021-08-03 RX ORDER — HEPARIN SODIUM (PORCINE) LOCK FLUSH IV SOLN 100 UNIT/ML 100 UNIT/ML
300 SOLUTION INTRAVENOUS ONCE
OUTPATIENT
Start: 2021-08-03

## 2021-08-03 RX ORDER — SODIUM CHLORIDE 0.9 % (FLUSH) 0.9 %
20 SYRINGE (ML) INJECTION AS NEEDED
Status: DISCONTINUED | OUTPATIENT
Start: 2021-08-03 | End: 2021-08-04 | Stop reason: HOSPADM

## 2021-08-03 RX ORDER — SODIUM CHLORIDE 0.9 % (FLUSH) 0.9 %
10 SYRINGE (ML) INJECTION AS NEEDED
OUTPATIENT
Start: 2021-08-03

## 2021-08-03 RX ORDER — SODIUM CHLORIDE 0.9 % (FLUSH) 0.9 %
20 SYRINGE (ML) INJECTION AS NEEDED
OUTPATIENT
Start: 2021-08-03

## 2021-08-03 RX ORDER — HEPARIN SODIUM (PORCINE) LOCK FLUSH IV SOLN 100 UNIT/ML 100 UNIT/ML
500 SOLUTION INTRAVENOUS AS NEEDED
OUTPATIENT
Start: 2021-08-03

## 2021-08-03 RX ORDER — HEPARIN SODIUM (PORCINE) LOCK FLUSH IV SOLN 100 UNIT/ML 100 UNIT/ML
500 SOLUTION INTRAVENOUS AS NEEDED
Status: DISCONTINUED | OUTPATIENT
Start: 2021-08-03 | End: 2021-08-04 | Stop reason: HOSPADM

## 2021-08-03 RX ADMIN — SODIUM CHLORIDE, PRESERVATIVE FREE 20 ML: 5 INJECTION INTRAVENOUS at 13:02

## 2021-08-03 RX ADMIN — SODIUM CHLORIDE 1000 ML: 9 INJECTION, SOLUTION INTRAVENOUS at 12:00

## 2021-08-03 RX ADMIN — HEPARIN SODIUM (PORCINE) LOCK FLUSH IV SOLN 100 UNIT/ML 500 UNITS: 100 SOLUTION at 13:02

## 2021-08-03 NOTE — PROGRESS NOTES
"Outpatient Nutrition Oncology Follow Up    Patient Name: Elver Sanders  YOB: 1955  MRN: [id number]  Assessment Date: 8/3/2021    CLINICAL NUTRITION ASSESSMENT    Dx:  Gallbladder Ca      Type of Cancer Treatment         CLINICAL NUTRITION ASSESSMENT      Reason for Assessment  Follow-up protocol     H&P:    Past Medical History:   Diagnosis Date   • Gallbladder cancer (CMS/HCC)    • Skin cancer         Current Problems:   Patient Active Problem List   Diagnosis Code   • Gallbladder cancer, carcinoma (CMS/HCC) C23   • Gallstones K80.20   • Dehydration E86.0   • Abdominal infection (CMS/HCC) K65.9   • Diarrhea R19.7   • Colitis, Clostridium difficile A04.72        Anthropometrics     Row Name 08/03/21 1013          Anthropometrics    Height  169.5 cm (66.73\")     Weight  54.1 kg (119 lb 4.3 oz)        Ideal Body Weight (IBW)    Ideal Body Weight (IBW) (kg)  67.35     % Ideal Body Weight  80.33        Body Mass Index (BMI)    BMI (kg/m2)  18.87           BMI kg/m2   Body mass index is 18.83 kg/m².    Weight Hx  Wt Readings from Last 30 Encounters:   08/03/21 1046 54.1 kg (119 lb 4.3 oz)   08/03/21 1013 54.1 kg (119 lb 4.3 oz)   07/29/21 1334 56.2 kg (124 lb)   07/23/21 1319 59.2 kg (130 lb 8.2 oz)   07/02/21 0824 64 kg (141 lb 1.5 oz)   06/24/21 0834 67.3 kg (148 lb 5.9 oz)   06/24/21 1048 67.3 kg (148 lb 5.9 oz)   06/17/21 0835 66.7 kg (147 lb 0.8 oz)   06/17/21 0958 66.7 kg (147 lb 0.8 oz)   05/27/21 0939 70.8 kg (156 lb 1.4 oz)   05/19/21 1503 68.7 kg (151 lb 7.3 oz)   05/06/21 0906 69 kg (152 lb 1.9 oz)   04/16/21 0953 64 kg (141 lb 1.5 oz)   03/23/21 0921 71.8 kg (158 lb 4.6 oz)   03/02/21 0859 74.8 kg (164 lb 14.5 oz)   01/21/21 0827 81.1 kg (178 lb 12.7 oz)   12/31/20 0848 81.8 kg (180 lb 5.4 oz)   12/07/20 1311 83.1 kg (183 lb 3.2 oz)   11/24/20 0000 83.5 kg (184 lb 2 oz)   09/22/20 1459 87.5 kg (192 lb 14.4 oz)   09/15/20 0000 85 kg (187 lb 8 oz)   09/03/20 0000 88 kg (194 lb)   09/01/20 0000 " 88.1 kg (194 lb 2 oz)        Labs/Medications        Pertinent Labs Reviewed.   Results from last 7 days   Lab Units 08/03/21  1006   SODIUM mmol/L 129*   POTASSIUM mmol/L 4.1   CHLORIDE mmol/L 98   CO2 mmol/L 22.7   BUN mg/dL 22   CREATININE mg/dL 0.54*   CALCIUM mg/dL 8.2*   BILIRUBIN mg/dL 0.4   ALK PHOS U/L 138*   ALT (SGPT) U/L 4   AST (SGOT) U/L 4   GLUCOSE mg/dL 141*     Results from last 7 days   Lab Units 08/03/21  1006   HEMOGLOBIN g/dL 9.3*   HEMATOCRIT % 29.9*     Coronavirus (COVID-19)   Date Value Ref Range Status   08/29/2020 NOT DETECTED NA Final     Comment:     The SARS-CoV-2 assay is a real-time, RT-PCR test intended  for the qualitative detection of nucleic acid from the  SARS-CoV-2 in respiratory specimens from individuals,  testing performed at [facility].       No results found for: HGBA1C      Pertinent Medications Biotene Dry Mouth Gentle, DULoxetine, HYDROcodone-acetaminophen, OLANZapine, colestipol, dronabinol, hydrOXYzine, loperamide, ondansetron ODT, and vancomycin     Physical Findings            Current Nutrition Orders & Evaluation of Intake       Oral Nutrition     Current PO Diet    Supplement      Enteral Nutrition     Current Formula    Schedule      Parenteral Nutrition     Current Prescription    Schedule      Nutrition Diagnosis        Nutrition Dx Problem 1 Severe malnutrition related to increased nutrient needs due to catabolic disease as evidenced by physiological causes increasing nutrient needs., hypermetabolic state., muscle wasting., fat loss. and inadequate energy intake.       Nutrition Intervention       RD Action See comment below     Monitor/Evaluation       Monitor Per oncology nutrition protocol.     Comments:    Pt received IV fluids and antiemetics today.  Oncology RD did not speak with pt or wife today, however communicated with infusion RN.  Pt has lost 8.6% body wt in 1-2 weeks; total of 20% body wt in 6 weeks.  Per nutrition-focused physical exam,  pt has severe muscle wasting (temporal) and severe subcutaneous fat loss (orbital), along with severe wt decline, this indicates severe malnutrition.  Pt has had a recent C.diff infection and severe diarrhea/multiple loose BMs daily.  He has had a multitude of nutrition-related concerns over the past several months, including taste alterations, diarrhea, nausea, anorexia, and xerostomia.  Current medications noted:  Anti-diarrhea, anti-emetics, appetite stimulants, mouth lubricants, and a bile acid sequestrant.  Last oncology RD encounter 6/17/21.  Pt & wife have been spoken to many times in the past- ideas for maximizing pt's nutritional intake has been discussed.  Pt had previously considered hospice (March 2021), but then did not go through with the services.  Pt has been spoken to about a feeding tube in the past, but declined.  Discussed this option again with infusion RN today, who communicated this to oncologist/oncologist's RN.  For now, wife & pt are once again reconsidering hospice services.  Currently, MD is focusing on treating pt's infectious diarrhea (per infusion RN).      If appropriate, oncology RD can provide recommendations for nutrition support.    RD remains available per protocol.    Electronically signed by:  Maggie Sánchez RD  08/03/21 14:23 EDT

## 2021-08-03 NOTE — ADDENDUM NOTE
Encounter addended by: Maggie Sánchez RD on: 8/3/2021 3:01 PM   Actions taken: Order Reconciliation Section accessed, Medication List reviewed

## 2021-08-03 NOTE — PROGRESS NOTES
Patient  Elver Sanders    Location  [facility] HEMATOLOGY & ONCOLOGY    Chief Complaint  Gallbladder cancer and Chemotherapy    Referring Provider: Maggie Coon MD PhD  PCP: Daniel Gonzalez MD    Subjective          Oncology/Hematology History Overview Note   Gallbladder cancer:  Mr. Sanders comes in today with his wife to discuss the plan of care regarding his new diagnosis of gallbladder cancer.  He was referred to me by Dr. Rolando Masters, his general surgeon.    Patient states he is always been in good health and takes no medications.  About 1 month ago he started to have a little bit of epigastric abdominal pain.  Then the pain got significantly worse and he went to the emergency room.  There and ultrasound revealed thickening of the gallbladder.  He underwent a simple cholecystectomy on 8/29/2020.  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  The tumor perforated through the serosa.  Reports indicate the gallbladder ruptured into the surgical cavity during the procedure.  Dr. Thayer attempted to wash the peritoneal cavity.    11/18/2020: Laparoscopy with biopsies revealed peritoneal carcinomatosis.  Biopsies were positive for metastatic undifferentiated carcinoma, morphologically consistent with known gallbladder primary.  Possible liver metastases that were seen on imaging at  were found to be on the surface of the liver instead.  He was also found to have a mass around his colon.  The recommendation was for colonoscopy which the patient had recently.  The colonoscopy was negative for evidence of colon cancer.    C1D1 of [place]/Cis. C2D1 on 1/21/21 2/4/21 (Comparison image from 11/4/20 at  not available at the time of this read):   CT chest:  1. No evidence of metastatic disease within the chest.    2. Elevated right hemidiaphragm with bandlike atelectasis within the right middle lobe and right lower  lobe.     CT abdomen and pelvis:  1. Abnormal low-density perihepatic lesions which may represent capsular metastasis or mucinous pseudomyxoma peritonei.  2. Infiltrative soft tissue within the gallbladder fossa with suspected fistulization to the hepatic flexure.  3. Abnormal low-density mass involving the pancreatic head and body without ductal obstruction or vascular occlusion.  4. Abnormal low-density the lesions along the posterolateral aspect of the cecum and ascending colon.     [facility] Molecular Report: PDL1 3+ (80%), TMB low (6mut/Mb), MSI-S     C1 Keytruda on 3/2/21.  He enrolled in Hospice just after C2 of Keytruda but then clinically improved and restarted Keytruda on 4/16/21.     Gallbladder malignant neoplasm (CMS/HCC) (Resolved)   5/19/2021 Initial Diagnosis    Gallbladder malignant neoplasm (CMS/HCC)     Gallbladder cancer, carcinoma (CMS/HCC)   3/2/2021 -  Chemotherapy    OP Pembrolizumab 200 mg     6/15/2021 Initial Diagnosis    Gallbladder cancer, carcinoma (CMS/HCC)     6/17/2021 -  Chemotherapy    OP CENTRAL VENOUS ACCESS DEVICE ACCESS, CARE, AND MAINTENANCE (CVAD)     6/17/2021 -  Chemotherapy    OP SUPPORTIVE HYDRATION + ANTIEMETICS         History of Present Illness  The patient comes in today to discuss his symptoms. He was with his wife who did most of the talking.  He is still having about 11 BMs per day while on vancomycin for Cdiff. He was seen by GI.  They recommend he increase colestipol if needed and start Lomotil as well.  His wife asks about the implications of treatment and his prognosis.  Since his prognosis is poor the patient is considering no doing anything further and going home with hospice.      Review of Systems   Constitutional: Positive for fatigue. Negative for appetite change, diaphoresis, fever, unexpected weight gain and unexpected weight loss.   HENT: Negative for hearing loss, mouth sores, sore throat, swollen glands, trouble swallowing and voice change.    Eyes:  Negative for blurred vision.   Respiratory: Negative for cough, shortness of breath and wheezing.    Cardiovascular: Negative for chest pain and palpitations.   Gastrointestinal: Positive for diarrhea. Negative for abdominal pain, blood in stool, constipation, nausea and vomiting.   Endocrine: Negative for cold intolerance and heat intolerance.   Genitourinary: Negative for difficulty urinating, dysuria, frequency, hematuria and urinary incontinence.   Musculoskeletal: Negative for arthralgias, back pain and myalgias.   Skin: Negative for rash, skin lesions and bruise.   Neurological: Negative for dizziness, seizures, weakness, numbness and headache.   Hematological: Does not bruise/bleed easily.   Psychiatric/Behavioral: Negative for depressed mood. The patient is not nervous/anxious.        Past Medical History:   Diagnosis Date   • Gallbladder cancer (CMS/HCC)    • Skin cancer      Past Surgical History:   Procedure Laterality Date   • CHOLECYSTECTOMY     • COLONOSCOPY     • HAND SURGERY     • SKIN CANCER EXCISION       Social History     Socioeconomic History   • Marital status:      Spouse name: Not on file   • Number of children: 2   • Years of education: Not on file   • Highest education level: Not on file   Tobacco Use   • Smoking status: Former Smoker     Start date: 1988     Quit date: 1998     Years since quittin.1   • Smokeless tobacco: Former User     Types: Chew     Quit date: 2020   Vaping Use   • Vaping Use: Never used   Substance and Sexual Activity   • Alcohol use: Not Currently     Comment: 0-2 DRINKS PER DAY   • Drug use: Never   • Sexual activity: Defer     Family History   Problem Relation Age of Onset   • Breast cancer Mother    • Cancer Maternal Aunt    • Cancer Paternal Uncle        Objective   Physical Exam  General: Alert, cooperative, no acute distress but chronically ill appearing and very thin  Eyes: Anicteric sclera, PERRLA  Respiratory: normal respiratory  "effort  Abdomen: Normal active bowel sounds  Skin: pale  Psychiatric: flat affect, does not speak much unless asked direct questions   Neurologic: No focal sensory or motor deficits, no weakness, numbness, dizziness  Musculoskeletal: reduced muscle strength and tone, in a wheelchair  Extremities: No clubbing, cyanosis, or deformities      Vitals:    08/03/21 1046   BP: 94/66   Pulse: 102   Resp: 16   Temp: 97.4 °F (36.3 °C)   TempSrc: Temporal   SpO2: 100%   Weight: 54.1 kg (119 lb 4.3 oz)   Height: 169.5 cm (66.73\")   PainSc: 0-No pain               PHQ-9 Total Score:         Result Review :   The following data was reviewed by: Maggie Coon MD PhD on 08/03/2021:  Lab Results   Component Value Date    HGB 9.3 (L) 08/03/2021    HCT 29.9 (L) 08/03/2021    MCV 79.3 08/03/2021     08/03/2021    WBC 20.61 (C) 08/03/2021    NEUTROABS 16.63 (H) 08/03/2021    LYMPHSABS 1.55 08/03/2021    MONOSABS 1.44 (H) 08/03/2021    EOSABS 0.85 (H) 08/03/2021    BASOSABS 0.06 08/03/2021     Lab Results   Component Value Date    GLUCOSE 141 (H) 08/03/2021    BUN 22 08/03/2021    CREATININE 0.54 (L) 08/03/2021     (L) 08/03/2021    K 4.1 08/03/2021    CL 98 08/03/2021    CO2 22.7 08/03/2021    CALCIUM 8.2 (L) 08/03/2021    PROTEINTOT 6.1 08/03/2021    ALBUMIN 2.73 (L) 08/03/2021    BILITOT 0.4 08/03/2021    ALKPHOS 138 (H) 08/03/2021    AST 4 08/03/2021    ALT 4 08/03/2021          Assessment and Plan    Diagnoses and all orders for this visit:    1. Gallbladder cancer, carcinoma (CMS/HCC) (Primary)  -     Cancel: sodium chloride 0.9 % bolus 1,000 mL    2. Diarrhea of infectious origin    3. Colitis, Clostridium difficile      Gallbladder Cancer: Pt has an initial response to immunotherapy but has not had is for several months due to infectious colitis.  He is not a candidate for further chemotherapy and immunotherapy is not safe to give at this point.  I do recommend the pt and his wife discuss Hospice again.  I will " f/u in 1 week by phone to see if he has improved or not and if he wishes to re-enroll in Hospice.        Diarrhea: related to C.diff infection and possible to the underlying disease in his abdomen. I have discussed his case with Dr. Castro in GI.  I recommend a repeat CT of the A/P to better assess the underlying disease but the patient is not interested if it will not impact his treatment to a great degree. We can discuss this again next week. I will add Flagyl to his regimen. We discussed Augmentin as well but I will wait until next week to discuss this again as it may worsen his diarrhea. He will get IVF with NS today for dehydration.          Patient was given instructions and counseling regarding his condition or for health maintenance advice. Please see specific information pulled into the AVS if appropriate.     Maggie Coon MD PhD    8/3/2021

## 2021-08-04 DIAGNOSIS — A09 DIARRHEA OF INFECTIOUS ORIGIN: Primary | ICD-10-CM

## 2021-08-04 RX ORDER — METRONIDAZOLE 500 MG/1
500 TABLET ORAL 3 TIMES DAILY
Qty: 21 TABLET | Refills: 0 | Status: SHIPPED | OUTPATIENT
Start: 2021-08-04 | End: 2021-08-11

## 2021-08-04 NOTE — TELEPHONE ENCOUNTER
Kari called and states that the [facility] in [place] has not received the new antibiotic script yet.

## 2021-08-07 PROBLEM — A09 DIARRHEA OF INFECTIOUS ORIGIN: Status: ACTIVE | Noted: 2021-07-29

## 2021-08-07 PROBLEM — M25.552 LEFT HIP PAIN: Status: ACTIVE | Noted: 2021-08-07

## 2021-08-10 ENCOUNTER — OFFICE VISIT (OUTPATIENT)
Dept: ONCOLOGY | Facility: HOSPITAL | Age: 66
End: 2021-08-10

## 2021-08-10 DIAGNOSIS — C23 GALLBLADDER CANCER, CARCINOMA (HCC): Primary | ICD-10-CM

## 2021-08-10 NOTE — PROGRESS NOTES
I contacted the patient today and spoke with his wife instead.  The patient was nearby in the same room.  We talked for about 3 minutes regarding the patient's symptoms.  He is still having diarrhea but it is improved after starting Flagyl.  He has decided to enroll in hospice again.  His wife said she will contact our clinic if his condition improves again and he wishes to come back to discuss further therapy.  There is no need for further CT imaging at this time.  I will also not schedule a follow-up appointment but I am available to talk if the patient or his wife has further questions.

## 2021-08-13 ENCOUNTER — DOCUMENTATION (OUTPATIENT)
Dept: ONCOLOGY | Facility: HOSPITAL | Age: 66
End: 2021-08-13

## 2023-05-25 NOTE — PROGRESS NOTES
Patient: ROYCE MATTHEW     Acct: [id number]     Report: #[id number]  UNIT #: [id number]     : 1955    Encounter Date:2020  PRIMARY CARE: Daniel Gonzalez  ***Signed***  --------------------------------------------------------------------------------------------------------------------  NURSE INTAKE      Visit Type      New Patient Visit            Chief Complaint      GALLBLADDER CANCER            Referring Provider/Copies To      Primary Care Provider:  Daniel Gonzalez      Copies To:   Daniel Gonzalez            History and Present Illness      Past Oncology Illness History      Mr. Matthew comes in today with his wife to discuss the plan of care regarding his    new diagnosis of gallbladder cancer.  He was referred to me by Dr. Rolando Masters,    his general surgeon.            Patient states he is always been in good health and takes no medications.  About    1 month ago he started to have a little bit of epigastric abdominal pain.  Then     the pain got significantly worse and he went to the emergency room.  There and     ultrasound revealed thickening of the gallbladder.  He underwent a simple     cholecystectomy on 2020.  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  The    tumor perforated through the serosa.  Reports indicate the gallbladder ruptured     into the surgical cavity during the procedure.  Dr. Thayer attempted to wash     the peritoneal cavity.            Clinical Staging      pT3pNx            ECOG Performance Status      0            PAST, FAMILY   Past Medical History      Past Medical History:  None      Hematology/Oncology (M):  Skin Cancer      Other Hematology History:        GALLBLADDER CANCER            Past Surgical History      Cholecystectomy, Skin Cancer Removal            Family History      Family History:  Breast Cancer (MOTHER)            Social History      Marital Status:         Lives independently:  Yes      Number of Children:  2            Tobacco Use      Tobacco status:  Former smoker      Smoking packs/day:  2      Currently Vaping:  No      Smoking history:  DIPS TOBACCO DAILY            Alcohol Use      Alcohol intake:  0-2 drinks per day            Substance Use      Substance use:  Denies use            REVIEW OF SYSTEMS      General:  Denies: Appetite Change, Fatigue, Fever, Night Sweats, Weight Gain,     Weight Loss      Eye:  Denies Blurred Vision, Denies Corrective Lenses, Denies Diplopia, Denies     Vision Changes      ENT:  Denies Headache, Denies Hearing Loss, Denies Hoarseness, Denies Sore     Throat      Cardiovascular:  Denies Chest Pain, Denies Palpitations      Respiratory:  Denies: Cough, Coughing Blood, Productive Cough, Shortness of Air,    Wheezing      Gastrointestinal:  Denies Bloody Stools, Denies Constipation, Denies Diarrhea,     Denies Nausea/Vomiting, Denies Problem Swallowing, Denies Unable to Control     Bowels      Genitourinary:  Denies Blood in Urine, Denies Incontinence, Denies Painful     Urination      Musculoskeletal:  Denies Back Pain, Denies Muscle Pain, Denies Painful Joints      Integumentary:  Denies Itching, Denies Lesions, Denies Rash      Neurologic:  Denies Dizziness, Denies Numbness\Tingling, Denies Seizures      Psychiatric:  Denies Anxiety, Denies Depression      Endocrine:  Denies Cold Intolerance, Denies Heat Intolerance      Hematologic/Lymphatic:  Denies Bruising, Denies Bleeding, Denies Enlarged Lymph     Nodes            VITAL SIGNS AND SCORES      Vitals      Height 5 ft 8.11 in / 173 cm      Weight 192 lbs 14.440 oz / 87.5 kg      BSA 2.02 m2      BMI 29.2 kg/m2      Temperature 97.5 F / 36.39 C - Temporal      Pulse 82      Respirations 18      Blood Pressure 136/80 Sitting, Left Arm      Pulse Oximetry 100%, ROOM AIR            Pain Score      Experiencing any pain?:  No      Pain Scale Used:  Numerical      Pain Intensity:   0            Fatigue Score      Experiencing any fatigue?:  No      Fatigue (0-10 scale):  0 (none)            EXAM      General: Alert, cooperative, no acute distress      Eyes: Anicteric sclera, PERRLA      Respiratory: CTAB, normal respiratory effort      Abdomen: Normal active bowel sounds, no tenderness, no distention      Cardiovascular: RRR, no murmur, no peripheral edema      Skin: Normal tone, no rash, no lesions      Psychiatric: Appropriate affect, intact judgment      Neurologic: No focal sensory or motor deficits, no weakness, numbness, dizziness      Musculoskeletal: Normal muscle strength, normal muscle tone      Extremities: No clubbing, cyanosis, or deformities            PREVENTION      Hx Influenza Vaccination:  Yes      Date Influenza Vaccine Given:  Oct 18, 2019      2 or More Falls in Past Year?:  No      Fall Past Year with Injury?:  Yes      Hx Pneumococcal Vaccination:  Yes      Encouraged to follow-up with:  PCP regarding preventative exams.      Chart initiated by      MILTON BOYLE            ALLERGY/MEDS      Allergies      Coded Allergies:             NO KNOWN ALLERGIES (Unverified , 9/22/20)            Medications      Last Reconciled on 9/28/20 23:42 by HOLLIE ELLIOTT      No Active Prescriptions or Reported Meds            Medications Reviewed:  Changes made to meds            IMPRESSION/PLAN      Diagnosis      Malignant tumor gallbladder - C23            Notes      Discontinued Medications      * HYDROcodone-Acetaminophen 5-325 Mg 1 EACH TABLET: 1-2 TAB PO Q4H Post Surgical      PAin #15      * Promethazine Hcl (Phenergan*) 12.5 MG TABLET: 1-2 TAB PO Q6H PRN NAUSEA AND/OR      VOMITING #10            Plan      Gallbladder cancer: Patient has an undifferentiated (anaplastic) carcinoma of     the gallbladder, pT3PNX.  I discussed the plan of care with the patient and his     wife.  I will refer him to Dr. Vaughn at [facility] for resection     of the gallbladder  rakel.  I have already discussed the case with Dr. Vaughn.     He plans to evaluate the patient first but will likely recommend neoadjuvant     chemotherapy.  I will follow-up with the patient in 2 weeks to discuss the plan     of care further.            Patient Education      Patient Education Provided:  Yes            Electronically signed by HOLLIE ELLIOTT  09/28/2020 23:42       Disclaimer: Converted document may not contain table formatting or lab diagrams. Please see [facility] for the authenticated document.   show